# Patient Record
Sex: FEMALE | Race: WHITE | Employment: PART TIME | URBAN - METROPOLITAN AREA
[De-identification: names, ages, dates, MRNs, and addresses within clinical notes are randomized per-mention and may not be internally consistent; named-entity substitution may affect disease eponyms.]

---

## 2017-02-15 PROBLEM — E11.42 TYPE 2 DIABETES MELLITUS WITH DIABETIC POLYNEUROPATHY, WITHOUT LONG-TERM CURRENT USE OF INSULIN (HCC): Status: ACTIVE | Noted: 2017-02-15

## 2017-02-27 PROBLEM — K43.0 INCISIONAL HERNIA WITH OBSTRUCTION BUT NO GANGRENE: Status: ACTIVE | Noted: 2017-02-27

## 2017-02-27 PROBLEM — M62.08 DIASTASIS OF RECTUS ABDOMINIS: Status: ACTIVE | Noted: 2017-02-27

## 2017-02-27 PROBLEM — E66.9 OBESITY, CLASS I, BMI 30.0-34.9 (SEE ACTUAL BMI): Status: ACTIVE | Noted: 2017-02-27

## 2017-03-14 ENCOUNTER — HOSPITAL ENCOUNTER (OUTPATIENT)
Dept: CT IMAGING | Age: 67
Discharge: HOME | End: 2017-03-14
Attending: SURGERY
Payer: MEDICARE

## 2017-03-14 VITALS — WEIGHT: 176 LBS | BODY MASS INDEX: 30.05 KG/M2 | HEIGHT: 64 IN

## 2017-03-14 DIAGNOSIS — M62.08 DIASTASIS OF RECTUS ABDOMINIS: ICD-10-CM

## 2017-03-14 DIAGNOSIS — E66.9 OBESITY, CLASS I, BMI 30.0-34.9 (SEE ACTUAL BMI): ICD-10-CM

## 2017-03-14 DIAGNOSIS — K43.0 INCISIONAL HERNIA WITH OBSTRUCTION BUT NO GANGRENE: ICD-10-CM

## 2017-03-14 PROCEDURE — 74011636320 HC RX REV CODE- 636/320: Performed by: SURGERY

## 2017-03-14 PROCEDURE — 74177 CT ABD & PELVIS W/CONTRAST: CPT

## 2017-03-14 PROCEDURE — 74011000258 HC RX REV CODE- 258: Performed by: SURGERY

## 2017-03-14 RX ORDER — SODIUM CHLORIDE 0.9 % (FLUSH) 0.9 %
10 SYRINGE (ML) INJECTION
Status: COMPLETED | OUTPATIENT
Start: 2017-03-14 | End: 2017-03-14

## 2017-03-14 RX ADMIN — DIATRIZOATE MEGLUMINE AND DIATRIZOATE SODIUM 15 ML: 660; 100 LIQUID ORAL; RECTAL at 14:38

## 2017-03-14 RX ADMIN — IOVERSOL 100 ML: 741 INJECTION INTRA-ARTERIAL; INTRAVENOUS at 14:38

## 2017-03-14 RX ADMIN — Medication 10 ML: at 14:38

## 2017-03-14 RX ADMIN — SODIUM CHLORIDE 100 ML: 900 INJECTION, SOLUTION INTRAVENOUS at 14:38

## 2017-04-19 ENCOUNTER — ANESTHESIA EVENT (OUTPATIENT)
Dept: ENDOSCOPY | Age: 67
End: 2017-04-19
Payer: MEDICARE

## 2017-04-19 RX ORDER — HYDROMORPHONE HYDROCHLORIDE 2 MG/ML
0.5 INJECTION, SOLUTION INTRAMUSCULAR; INTRAVENOUS; SUBCUTANEOUS
Status: CANCELLED | OUTPATIENT
Start: 2017-04-19

## 2017-04-19 RX ORDER — OXYCODONE HYDROCHLORIDE 5 MG/1
5 TABLET ORAL
Status: CANCELLED | OUTPATIENT
Start: 2017-04-19

## 2017-04-19 RX ORDER — SODIUM CHLORIDE, SODIUM LACTATE, POTASSIUM CHLORIDE, CALCIUM CHLORIDE 600; 310; 30; 20 MG/100ML; MG/100ML; MG/100ML; MG/100ML
75 INJECTION, SOLUTION INTRAVENOUS CONTINUOUS
Status: CANCELLED | OUTPATIENT
Start: 2017-04-19

## 2017-04-19 RX ORDER — NALOXONE HYDROCHLORIDE 0.4 MG/ML
0.1 INJECTION, SOLUTION INTRAMUSCULAR; INTRAVENOUS; SUBCUTANEOUS AS NEEDED
Status: CANCELLED | OUTPATIENT
Start: 2017-04-19 | End: 2017-04-19

## 2017-04-19 RX ORDER — SODIUM CHLORIDE 0.9 % (FLUSH) 0.9 %
5-10 SYRINGE (ML) INJECTION AS NEEDED
Status: CANCELLED | OUTPATIENT
Start: 2017-04-19

## 2017-04-19 RX ORDER — ACETAMINOPHEN 500 MG
500 TABLET ORAL ONCE
Status: CANCELLED | OUTPATIENT
Start: 2017-04-19 | End: 2017-04-19

## 2017-04-19 RX ORDER — DIPHENHYDRAMINE HYDROCHLORIDE 50 MG/ML
12.5 INJECTION, SOLUTION INTRAMUSCULAR; INTRAVENOUS ONCE
Status: CANCELLED | OUTPATIENT
Start: 2017-04-19 | End: 2017-04-19

## 2017-04-19 RX ORDER — OXYCODONE HYDROCHLORIDE 5 MG/1
10 TABLET ORAL
Status: CANCELLED | OUTPATIENT
Start: 2017-04-19

## 2017-04-19 RX ORDER — ONDANSETRON 2 MG/ML
4 INJECTION INTRAMUSCULAR; INTRAVENOUS ONCE
Status: CANCELLED | OUTPATIENT
Start: 2017-04-19 | End: 2017-04-19

## 2017-04-20 ENCOUNTER — ANESTHESIA (OUTPATIENT)
Dept: ENDOSCOPY | Age: 67
End: 2017-04-20
Payer: MEDICARE

## 2017-04-20 ENCOUNTER — HOSPITAL ENCOUNTER (OUTPATIENT)
Age: 67
Setting detail: OUTPATIENT SURGERY
Discharge: HOME | End: 2017-04-20
Attending: SURGERY | Admitting: SURGERY
Payer: MEDICARE

## 2017-04-20 VITALS
BODY MASS INDEX: 31.01 KG/M2 | TEMPERATURE: 97 F | OXYGEN SATURATION: 98 % | HEART RATE: 72 BPM | SYSTOLIC BLOOD PRESSURE: 144 MMHG | RESPIRATION RATE: 16 BRPM | DIASTOLIC BLOOD PRESSURE: 71 MMHG | WEIGHT: 175 LBS | HEIGHT: 63 IN

## 2017-04-20 LAB — GLUCOSE BLD STRIP.AUTO-MCNC: 113 MG/DL (ref 65–100)

## 2017-04-20 PROCEDURE — 74011000250 HC RX REV CODE- 250: Performed by: ANESTHESIOLOGY

## 2017-04-20 PROCEDURE — 76060000032 HC ANESTHESIA 0.5 TO 1 HR: Performed by: SURGERY

## 2017-04-20 PROCEDURE — 77030029929: Performed by: SURGERY

## 2017-04-20 PROCEDURE — 77030013996 HC SNR POLYP ENDOSC OCOA -B: Performed by: SURGERY

## 2017-04-20 PROCEDURE — 76040000026: Performed by: SURGERY

## 2017-04-20 PROCEDURE — 74011250636 HC RX REV CODE- 250/636

## 2017-04-20 PROCEDURE — 88305 TISSUE EXAM BY PATHOLOGIST: CPT | Performed by: SURGERY

## 2017-04-20 PROCEDURE — 74011250636 HC RX REV CODE- 250/636: Performed by: ANESTHESIOLOGY

## 2017-04-20 PROCEDURE — 77030011640 HC PAD GRND REM COVD -A: Performed by: SURGERY

## 2017-04-20 PROCEDURE — 77030013991 HC SNR POLYP ENDOSC BSC -A: Performed by: SURGERY

## 2017-04-20 PROCEDURE — 74011000250 HC RX REV CODE- 250

## 2017-04-20 PROCEDURE — 82962 GLUCOSE BLOOD TEST: CPT

## 2017-04-20 RX ORDER — SODIUM CHLORIDE 0.9 % (FLUSH) 0.9 %
5-10 SYRINGE (ML) INJECTION EVERY 8 HOURS
Status: DISCONTINUED | OUTPATIENT
Start: 2017-04-20 | End: 2017-04-20 | Stop reason: HOSPADM

## 2017-04-20 RX ORDER — PROPOFOL 10 MG/ML
INJECTION, EMULSION INTRAVENOUS AS NEEDED
Status: DISCONTINUED | OUTPATIENT
Start: 2017-04-20 | End: 2017-04-20 | Stop reason: HOSPADM

## 2017-04-20 RX ORDER — FAMOTIDINE 20 MG/1
20 TABLET, FILM COATED ORAL ONCE
Status: DISCONTINUED | OUTPATIENT
Start: 2017-04-20 | End: 2017-04-20 | Stop reason: HOSPADM

## 2017-04-20 RX ORDER — LIDOCAINE HYDROCHLORIDE 10 MG/ML
0.1 INJECTION INFILTRATION; PERINEURAL AS NEEDED
Status: DISCONTINUED | OUTPATIENT
Start: 2017-04-20 | End: 2017-04-20 | Stop reason: HOSPADM

## 2017-04-20 RX ORDER — ESMOLOL HYDROCHLORIDE 10 MG/ML
INJECTION INTRAVENOUS AS NEEDED
Status: DISCONTINUED | OUTPATIENT
Start: 2017-04-20 | End: 2017-04-20 | Stop reason: HOSPADM

## 2017-04-20 RX ORDER — SODIUM CHLORIDE 0.9 % (FLUSH) 0.9 %
5-10 SYRINGE (ML) INJECTION AS NEEDED
Status: DISCONTINUED | OUTPATIENT
Start: 2017-04-20 | End: 2017-04-20 | Stop reason: HOSPADM

## 2017-04-20 RX ORDER — LIDOCAINE HYDROCHLORIDE 20 MG/ML
INJECTION, SOLUTION EPIDURAL; INFILTRATION; INTRACAUDAL; PERINEURAL AS NEEDED
Status: DISCONTINUED | OUTPATIENT
Start: 2017-04-20 | End: 2017-04-20 | Stop reason: HOSPADM

## 2017-04-20 RX ORDER — SODIUM CHLORIDE, SODIUM LACTATE, POTASSIUM CHLORIDE, CALCIUM CHLORIDE 600; 310; 30; 20 MG/100ML; MG/100ML; MG/100ML; MG/100ML
1000 INJECTION, SOLUTION INTRAVENOUS CONTINUOUS
Status: DISCONTINUED | OUTPATIENT
Start: 2017-04-20 | End: 2017-04-20 | Stop reason: HOSPADM

## 2017-04-20 RX ORDER — FENTANYL CITRATE 50 UG/ML
100 INJECTION, SOLUTION INTRAMUSCULAR; INTRAVENOUS AS NEEDED
Status: DISCONTINUED | OUTPATIENT
Start: 2017-04-20 | End: 2017-04-20 | Stop reason: HOSPADM

## 2017-04-20 RX ORDER — FAMOTIDINE 10 MG/ML
20 INJECTION INTRAVENOUS ONCE
Status: COMPLETED | OUTPATIENT
Start: 2017-04-20 | End: 2017-04-20

## 2017-04-20 RX ORDER — PROPOFOL 10 MG/ML
INJECTION, EMULSION INTRAVENOUS
Status: DISCONTINUED | OUTPATIENT
Start: 2017-04-20 | End: 2017-04-20 | Stop reason: HOSPADM

## 2017-04-20 RX ORDER — MIDAZOLAM HYDROCHLORIDE 1 MG/ML
2 INJECTION, SOLUTION INTRAMUSCULAR; INTRAVENOUS
Status: DISCONTINUED | OUTPATIENT
Start: 2017-04-20 | End: 2017-04-20 | Stop reason: HOSPADM

## 2017-04-20 RX ADMIN — PROPOFOL 50 MG: 10 INJECTION, EMULSION INTRAVENOUS at 08:11

## 2017-04-20 RX ADMIN — ESMOLOL HYDROCHLORIDE 30 MG: 10 INJECTION INTRAVENOUS at 08:26

## 2017-04-20 RX ADMIN — PROPOFOL 160 MCG/KG/MIN: 10 INJECTION, EMULSION INTRAVENOUS at 08:11

## 2017-04-20 RX ADMIN — PROPOFOL 30 MG: 10 INJECTION, EMULSION INTRAVENOUS at 08:16

## 2017-04-20 RX ADMIN — LIDOCAINE HYDROCHLORIDE 60 MG: 20 INJECTION, SOLUTION EPIDURAL; INFILTRATION; INTRACAUDAL; PERINEURAL at 08:09

## 2017-04-20 RX ADMIN — FAMOTIDINE 20 MG: 10 INJECTION, SOLUTION INTRAVENOUS at 07:37

## 2017-04-20 RX ADMIN — SODIUM CHLORIDE, SODIUM LACTATE, POTASSIUM CHLORIDE, AND CALCIUM CHLORIDE 1000 ML: 600; 310; 30; 20 INJECTION, SOLUTION INTRAVENOUS at 07:37

## 2017-04-20 NOTE — IP AVS SNAPSHOT
Cipriano Vance 
 
 
 [address] [facility] [address] [facility] 
[phone] Patient: Raulito Jones MRN: [id number] DMR:6/9/[birth year]44 You are allergic to the following No active allergies Recent Documentation Height Weight BMI OB Status Smoking Status 1.6 m 79.4 kg 31 kg/m2 Postmenopausal Never Smoker Emergency Contacts Name Discharge Info Relation Home Work Mobile [facility] DISCHARGE CAREGIVER [3] Son [22] [phone] About your hospitalization You were admitted on:  April 20, 2017 You last received care in the:  [facility] You were discharged on:  April 20, 2017 Unit phone number:  [phone] Why you were hospitalized Your primary diagnosis was:  Not on File Providers Seen During Your Hospitalizations Provider Role Specialty Primary office phone Ning Helton MD Attending Provider General Surgery [phone] Your Primary Care Physician (PCP) Primary Care Physician Office Phone Office Fax Peg Yin [phone] Follow-up Information None Current Discharge Medication List  
  
ASK your doctor about these medications Dose & Instructions Dispensing Information Comments Morning Noon Evening Bedtime  
 gabapentin 300 mg capsule Commonly known as:  NEURONTIN Your last dose was: Your next dose is:    
   
   
 Dose:  300 mg Take 1 Cap by mouth nightly as needed. Quantity:  90 Cap Refills:  3  
     
   
   
   
  
 glipiZIDE 10 mg tablet Commonly known as:  Shawn Domínguezel Your last dose was: Your next dose is:    
   
   
 Dose:  10 mg Take 1 Tab by mouth daily. Quantity:  90 Tab Refills:  3  
     
   
   
   
  
 lisinopril 10 mg tablet Commonly known as:  Yi Gravel Your last dose was:     
   
Your next dose is:    
   
   
 Dose:  10 mg  
 Take 1 Tab by mouth daily. Quantity:  90 Tab Refills:  3  
     
   
   
   
  
 metFORMIN 500 mg tablet Commonly known as:  GLUCOPHAGE Your last dose was: Your next dose is: TAKE TWO TABLETS BY MOUTH TWICE DAILY Quantity:  360 Tab Refills:  3  
     
   
   
   
  
 TYLENOL 325 mg tablet Generic drug:  acetaminophen Your last dose was: Your next dose is:    
   
   
 Dose:  650 mg Take 650 mg by mouth every four (4) hours as needed for Pain. Refills:  0 Discharge Instructions Gastrointestinal Colonoscopy/Flexible Sigmoidoscopy - Lower Exam Discharge Instructions 1. Call Dr. Sidney Carter for any problems or questions. 2. Contact the doctors office for follow up appointment as directed 3. Medication may cause drowsiness for several hours, therefore, do not drive or operate machinery for remainder of the day. 4. No alcohol today. 5. Ordinarily, you may resume regular diet and activity after exam unless otherwise specified by your physician. 6. Because of air put into your colon during exam, you may experience some abdominal distension, relieved by the passage of gas, for several hours. 7. Contact your physician if you have any of the following: 
a. Excessive amount of bleeding  large amount when having a bowel movement. Occasional specks of blood with bowel movement would not be unusual. 
b. Severe abdominal pain 
c. Fever or Chills 8. Polyp Removal  follow these additional instructions 
a. Clear liquid diet for the next meal (jell-o, broth, clear drinks) b. Soft diet for 24 hours, then resume regular diet  
c. Take Metamucil  1 tablespoon in juice every morning for 3 days 
d. No Aspirin, Advil, Aleve, Nuprin, Ibuprofen, or medications that contain these drugs for 2 weeks. Any additional instructions: Follow up in office in 1-2 weeks with Dr. Sidney Carter for pathology results. Instructions given to Merari Burger and other family members. Instructions given by:  Dr. Graciela Hinson Discharge Orders None ACO Transitions of Care Introducing [employer] [address] offers a voluntary care coordination program to provide high quality service and care to [facility] fee-for-service beneficiaries. [employer] was designed to help you enhance your health and well-being through the following services: ? Transitions of Care  support for individuals who are transitioning from one care setting to another (example: Hospital to home). ? Chronic and Complex Care Coordination  support for individuals and caregivers of those with serious or chronic illnesses or with more than one chronic (ongoing) condition and those who take a number of different medications. If you meet specific medical criteria, a [address] [place] [address] Rd may call you directly to coordinate your care with your primary care physician and your other care providers. For questions about the [facility] programs, please, contact your physicians office. For general questions or additional information about Accountable Care Organizations: 
Please visit www.medicare.gov/acos. html or call 1-800-MEDICARE (1-791.596.9994) TTY users should call 3-752.139.3531. Introducing [facility]! Carina Duran introduces [employer] patient portal. Now you can access parts of your medical record, email your doctor's office, and request medication refills online. 1. In your internet browser, go to [email or web address] 2. Click on the First Time User? Click Here link in the Sign In box. You will see the New Member Sign Up page. 3. Enter your [email or web address] Access Code exactly as it appears below. You will not need to use this code after youve completed the sign-up process.  If you do not sign up before the expiration date, you must request a new code. · [employer] Access Code: [email or web address] Expires: 5/14/2017 12:14 PM 
 
4. Enter the last four digits of your Social Security Number (xxxx) and Date of Birth (mm/dd/yyyy) as indicated and click Submit. You will be taken to the next sign-up page. 5. Create a [employer] ID. This will be your [employer] login ID and cannot be changed, so think of one that is secure and easy to remember. 6. Create a [employer] password. You can change your password at any time. 7. Enter your Password Reset Question and Answer. This can be used at a later time if you forget your password. 8. Enter your e-mail address. You will receive e-mail notification when new information is available in [address] 9. Click Sign Up. You can now view and download portions of your medical record. 10. Click the Download Summary menu link to download a portable copy of your medical information. If you have questions, please visit the Frequently Asked Questions section of the [employer] website. Remember, [employer] is NOT to be used for urgent needs. For medical emergencies, dial 911. Now available from your iPhone and Android! General Information Please provide this summary of care documentation to your next provider. Patient Signature:  ____________________________________________________________ Date:  ____________________________________________________________  
  
Karthik Roblero Provider Signature:  ____________________________________________________________ Date:  ____________________________________________________________

## 2017-04-20 NOTE — ANESTHESIA POSTPROCEDURE EVALUATION
Post-Anesthesia Evaluation and Assessment    Patient: Lianna Jimenez MRN: [id number]  SSN: [id number]    YOB: 1950  Age: 78 y.o. Sex: female       Cardiovascular Function/Vital Signs  Visit Vitals    /63    Pulse 81    Temp 36.1 °C (97 °F)    Resp 12    Ht 5' 3\" (1.6 m)    Wt 79.4 kg (175 lb)    SpO2 93%    BMI 31 kg/m2       Patient is status post total IV anesthesia anesthesia for Procedure(s):  COLONOSCOPY  BMI 29  ENDOSCOPIC POLYPECTOMY  INJECTION. Nausea/Vomiting: None    Postoperative hydration reviewed and adequate. Pain:  Pain Scale 1: Numeric (0 - 10) (04/20/17 0721)  Pain Intensity 1: 0 (04/20/17 0721)   Managed    Neurological Status: At baseline    Mental Status and Level of Consciousness: Arousable    Pulmonary Status:   O2 Device: Nasal cannula (04/20/17 0845)   Adequate oxygenation and airway patent    Complications related to anesthesia: None    Post-anesthesia assessment completed.  No concerns    Signed By: Connor Page MD     April 20, 2017

## 2017-04-20 NOTE — H&P
[facility]  [address] [place] [address]  [phone]    H&P Note   Willie Treadwell   MRN: [id number]     : 1950        HPI: Willie Treadwell is a 78 y.o. female who returns after CT scan evaluation of her abdominal wall which confirms her physical exam.  She has a 7.97 cm periumbilical defect that contains incarcerated fat and no bowel. She has a wide rectus diastasis and severe denervation of the R side of her abdominal wall with very little abdominal wall muscle until far lateral.  Select images are below. She also presents to discuss colonoscopy. She is past due and should be cleared prior to any hernia repair. She has normal daily BMs and denies seeing and blood or mucous per rectum. She has no FH for colon cancer or polyps. She was referred by Dr. Ramon Cunningham for ventral hernia. The patient is a very pleasant woman who had gallbladder surgery ~5 years ago. It was not performed at [facility]. She had attempt at laparoscopic but was converted to an open large R subcostal incision. She noticed a bulge ~4 years ago. She denies obstructive symptoms but she has been experiencing pain with lifting. 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 She would like a repair but would like to delay until the Spring flower season has calmed down. She is also referred for colonoscopy. Past Medical History:   Diagnosis Date    Diabetes ([address]     Hypertension     Type 2 diabetes mellitus with diabetic polyneuropathy, without long-term current use of insulin ([address] 2/15/2017    Type II or unspecified type diabetes mellitus without mention of complication, not stated as uncontrolled 2015    avg-150-no hypo symptoms     Past Surgical History:   Procedure Laterality Date    HX CHOLECYSTECTOMY       No current facility-administered medications for this encounter.       Current Outpatient Prescriptions   Medication Sig    acetaminophen (TYLENOL) 325 mg tablet Take 650 mg by mouth every four (4) hours as needed for Pain.  metFORMIN (GLUCOPHAGE) 500 mg tablet TAKE TWO TABLETS BY MOUTH TWICE DAILY (Patient taking differently: Take 1,000 mg by mouth two (2) times daily (with meals). TAKE TWO TABLETS BY MOUTH TWICE DAILY)    gabapentin (NEURONTIN) 300 mg capsule Take 1 Cap by mouth nightly as needed.  lisinopril (PRINIVIL, ZESTRIL) 10 mg tablet Take 1 Tab by mouth daily. (Patient taking differently: Take 10 mg by mouth every morning.)    glipiZIDE (GLUCOTROL) 10 mg tablet Take 1 Tab by mouth daily. (Patient taking differently: Take 10 mg by mouth every morning.)     ALLERGIES:  Review of patient's allergies indicates no known allergies. Social History     Social History    Marital status:      Spouse name: N/A    Number of children: N/A    Years of education: N/A     Social History Main Topics    Smoking status: Never Smoker    Smokeless tobacco: Never Used    Alcohol use No    Drug use: No    Sexual activity: Not Currently     Other Topics Concern    Not on file     Social History Narrative     History   Smoking Status    Never Smoker   Smokeless Tobacco    Never Used     Family History   Problem Relation Age of Onset    Diabetes Mother     Cancer Father     Cancer Sister     Cancer Sister     Cancer Brother        ROS: The patient has no difficulty with chest pain or shortness of breath. No fever or chills. The patient denies any personal or family history of abnormal clotting or bleeding. Comprehensive review of systems was otherwise unremarkable except as noted above. Physical Exam:   Constitutional: Alert oriented cooperative patient in no acute distress. Visit Vitals    /70    Pulse 75    Ht 5' 4\" (1.626 m)    Wt 173 lb 8 oz (78.7 kg)    SpO2 98%    BMI 29.78 kg/m2   Eyes:Sclera are clear without icterus. ENMT: no obvious neck masses, no ear or lip lesions  CV: RRR.  Normal perfusion  Resp: No JVD. Breathing is  non-labored. GI: obese, soft and non-distended, obvious bulge to L of umbilicus. Wide rectus diastasis, 2-3 cm defect associated with periumbilical incision scar with nonreducible bulging to left that does not feel to contain obvious bowel. Large and wide R subcostal incision with classic denervation pattern with atrophy and non contraction of R rectus abdominus inferior to the scar. The right side bulges with abdominal wall contraction. Musculoskeletal: unremarkable with normal function. Neuro:  No obvious focal deficits  Psychiatric: normal affect and mood, no memory impairment    No results found for: WBC, WBCLT, HGB, HGBP, HCT, PHCT, RBCH, PLT, MCV, HGBEXT, HCTEXT, PLTEXT, HGBEXT, HCTEXT, PLTEXT    Lab Results   Component Value Date/Time    Sodium 142 02/15/2017 10:14 AM    Potassium 4.6 02/15/2017 10:14 AM    Chloride 100 02/15/2017 10:14 AM    CO2 24 02/15/2017 10:14 AM    BUN 13 02/15/2017 10:14 AM    Creatinine 0.67 02/15/2017 10:14 AM    Glucose 176 02/15/2017 10:14 AM    Bilirubin, total 0.4 02/15/2017 10:14 AM    AST (SGOT) 17 02/15/2017 10:14 AM    Alk. phosphatase 91 02/15/2017 10:14 AM     I reviewed her CT images    CT ABDOMEN AND PELVIS WITH CONTRAST: 3/14/2017  HISTORY: evaluate incisional abdominal wall hernias and rectus diastasis;  evaluate incisional abdominal wall hernias and rectus diastasis, eval incisional  abd wall hernia lt abd. Pain worsening over past month. gb removed, no hx cancer  COMPARISON: None available  TECHNIQUE: Oral contrast was administered. 125 cc of nonionic intravenous  contrast was injected, and axial helical CT images were obtained from above the  diaphragm through the pelvis. Coronal reformatted images were obtained at the  scanner console and made available for review.  Radiation dose reduction  techniques were used for this study: Our CT scanners use one or all of the  following: Automated exposure control, adjustment of the mA and/or kVp according  to patient's size, iterative reconstruction. FINDINGS:  ABDOMEN:  Minimal dependent subsegmental atelectasis bilateral lung bases. Evidence of  cholecystectomy. There is a small fluid collection in the gallbladder fossa  measuring approximately 2.1 x 3.0 cm, most likely representing a small  postoperative seroma. Normal-appearing liver, spleen, pancreas, bilateral  adrenal glands and kidneys. Mild calcific atherosclerosis of a normal caliber  abdominal aorta. No evidence of significant lymphadenopathy. Small duodenal  diverticulum. Normal-appearing small bowel. No evidence of intraperitoneal  free air or free fluid. There is a moderate-sized fat-containing periumbilical  hernia, with a narrow neck measuring approximately 2.8 cm in diameter. PELVIS:  Normal-appearing urinary bladder. Normal-appearing uterus and bilateral adnexa. Normal-appearing colon and appendix. No evidence of pelvic free fluid. No  evidence of significant inguinal or pelvic sidewall lymphadenopathy. Visualized osseous structures unremarkable. IMPRESSION:   1. There is a moderate-sized fat-containing periumbilical hernia. 2. Other incidental findings as above.              Assessment/Plan:     Ruddy Baltazar is a 78 y.o. female who has signs and symptoms consistent with a periumbilical ventral incisional hernia from a trocar site that is complicated by R sided abdominal wall denervation and wide diastasis of the rectus muscles. She is also obese and has diabetes. The fascial defect is of moderate size but carries risk of bowel incarceration and obstruction. Unfortunately she is high risk for failure of the repair as well. She will require a mesh repair which carries a greater complication rate but has the greatest percentage to be the only durable repair. She will require an oversized mesh due to her poor surrounding tissue.   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 CT scan confirms her physical findings and is not at immediate risk for bowel incarceration. She needs her colon cleared prior to any hernia repair. We discussed next proceeding with colonoscopy. I discussed the patient's condition and treatment options with the patient. I discussed risks of colonoscopy in language the patient could understand including bleeding, infection, aspiration, perforation, medication reaction, need for further endoscopy or surgery, abscess, fistula, SBO, DVT, PE, heart attack, stroke, renal failure, respiratory failure, ventilatory dependence, and death. The patient voiced understanding of all this and all questions were answered. Alternatives to colonoscopy were discussed also and risks of the alternatives. The patient requested that we proceed with colonoscopy. Informed consent was obtained.      Problem List  Date Reviewed: 4/19/2017          Codes Class Noted    Incisional hernia with obstruction but no gangrene ICD-10-CM: K43.0  ICD-9-CM: 552.21  2/27/2017        Diastasis of rectus abdominis ICD-10-CM: M62.08  ICD-9-CM: 728.84  2/27/2017        Obesity, Class I, BMI 30.0-34.9 (see actual BMI) ICD-10-CM: E66.9  ICD-9-CM: 278.00  2/27/2017        Type 2 diabetes mellitus with diabetic polyneuropathy, without long-term current use of insulin ([facility] ICD-10-CM: E11.42  ICD-9-CM: 250.60, 357.2  2/15/2017        Type II diabetes mellitus ([facility] ICD-10-CM: E11.9  ICD-9-CM: 250.00  8/5/2015        Essential hypertension, benign ICD-10-CM: I10  ICD-9-CM: 401.1  8/5/2015                Nena Dao MD,  FACS

## 2017-04-20 NOTE — DISCHARGE INSTRUCTIONS
Gastrointestinal Colonoscopy/Flexible Sigmoidoscopy - Lower Exam Discharge Instructions  1. Call Dr. Tomás Cespedes for any problems or questions. 2. Contact the doctors office for follow up appointment as directed  3. Medication may cause drowsiness for several hours, therefore, do not drive or operate machinery for remainder of the day. 4. No alcohol today. 5. Ordinarily, you may resume regular diet and activity after exam unless otherwise specified by your physician. 6. Because of air put into your colon during exam, you may experience some abdominal distension, relieved by the passage of gas, for several hours. 7. Contact your physician if you have any of the following:  a. Excessive amount of bleeding - large amount when having a bowel movement. Occasional specks of blood with bowel movement would not be unusual.  b. Severe abdominal pain  c. Fever or Chills  8. Polyp Removal - follow these additional instructions  a. Clear liquid diet for the next meal (jell-o, broth, clear drinks)  b. Soft diet for 24 hours, then resume regular diet   c. Take Metamucil - 1 tablespoon in juice every morning for 3 days  d. No Aspirin, Advil, Aleve, Nuprin, Ibuprofen, or medications that contain these drugs for 2 weeks. Any additional instructions: Follow up in office in 1-2 weeks with Dr. Tomás Cespedes for pathology results. Instructions given to Cristian Guadalupe and other family members.   Instructions given by:  Dr. Tomás Cespedes

## 2017-04-20 NOTE — INTERVAL H&P NOTE
H&P Update:  Abner Gordon was seen and examined. History and physical has been reviewed. The patient has been examined.  There have been no significant clinical changes since the completion of the originally dated History and Physical.    Signed By: Jaden Baum MD     April 20, 2017 8:11 AM

## 2017-04-20 NOTE — ROUTINE PROCESS
Discharge instructions given to son. IV discontinued and skin c/d/i. Pt passed flatus and tolerating liquids well. Pt ready for discharge.

## 2017-04-20 NOTE — ANESTHESIA PREPROCEDURE EVALUATION
Anesthetic History   No history of anesthetic complications            Review of Systems / Medical History  Patient summary reviewed and pertinent labs reviewed    Pulmonary  Within defined limits                 Neuro/Psych   Within defined limits           Cardiovascular    Hypertension: well controlled              Exercise tolerance: >4 METS     GI/Hepatic/Renal  Within defined limits              Endo/Other    Diabetes    Obesity     Other Findings              Physical Exam    Airway  Mallampati: II  TM Distance: 4 - 6 cm  Neck ROM: normal range of motion   Mouth opening: Normal     Cardiovascular    Rhythm: regular  Rate: normal         Dental  No notable dental hx       Pulmonary  Breath sounds clear to auscultation               Abdominal  GI exam deferred       Other Findings            Anesthetic Plan    ASA: 2  Anesthesia type: total IV anesthesia          Induction: Intravenous  Anesthetic plan and risks discussed with: Patient

## 2017-04-28 PROBLEM — D12.6 TUBULOVILLOUS ADENOMA OF COLON: Status: ACTIVE | Noted: 2017-04-28

## 2017-06-21 ENCOUNTER — ANESTHESIA EVENT (OUTPATIENT)
Dept: ENDOSCOPY | Age: 67
End: 2017-06-21
Payer: MEDICARE

## 2017-06-21 RX ORDER — SODIUM CHLORIDE, SODIUM LACTATE, POTASSIUM CHLORIDE, CALCIUM CHLORIDE 600; 310; 30; 20 MG/100ML; MG/100ML; MG/100ML; MG/100ML
100 INJECTION, SOLUTION INTRAVENOUS CONTINUOUS
Status: CANCELLED | OUTPATIENT
Start: 2017-06-21

## 2017-06-21 RX ORDER — SODIUM CHLORIDE 0.9 % (FLUSH) 0.9 %
5-10 SYRINGE (ML) INJECTION AS NEEDED
Status: CANCELLED | OUTPATIENT
Start: 2017-06-21

## 2017-06-22 ENCOUNTER — ANESTHESIA (OUTPATIENT)
Dept: ENDOSCOPY | Age: 67
End: 2017-06-22
Payer: MEDICARE

## 2017-06-22 ENCOUNTER — HOSPITAL ENCOUNTER (OUTPATIENT)
Age: 67
Setting detail: OUTPATIENT SURGERY
Discharge: HOME | End: 2017-06-22
Attending: SURGERY | Admitting: SURGERY
Payer: MEDICARE

## 2017-06-22 VITALS
DIASTOLIC BLOOD PRESSURE: 74 MMHG | RESPIRATION RATE: 16 BRPM | TEMPERATURE: 96.8 F | OXYGEN SATURATION: 96 % | HEART RATE: 82 BPM | SYSTOLIC BLOOD PRESSURE: 140 MMHG

## 2017-06-22 LAB
GLUCOSE BLD STRIP.AUTO-MCNC: 120 MG/DL (ref 65–100)
GLUCOSE BLD STRIP.AUTO-MCNC: 148 MG/DL (ref 65–100)
GLUCOSE BLD STRIP.AUTO-MCNC: 210 MG/DL (ref 65–100)

## 2017-06-22 PROCEDURE — 77030011640 HC PAD GRND REM COVD -A: Performed by: SURGERY

## 2017-06-22 PROCEDURE — 74011000250 HC RX REV CODE- 250

## 2017-06-22 PROCEDURE — 88305 TISSUE EXAM BY PATHOLOGIST: CPT | Performed by: SURGERY

## 2017-06-22 PROCEDURE — 82962 GLUCOSE BLOOD TEST: CPT

## 2017-06-22 PROCEDURE — 76060000032 HC ANESTHESIA 0.5 TO 1 HR: Performed by: SURGERY

## 2017-06-22 PROCEDURE — 76040000025: Performed by: SURGERY

## 2017-06-22 PROCEDURE — 74011250636 HC RX REV CODE- 250/636

## 2017-06-22 PROCEDURE — 74011636637 HC RX REV CODE- 636/637: Performed by: ANESTHESIOLOGY

## 2017-06-22 PROCEDURE — 77030013991 HC SNR POLYP ENDOSC BSC -A: Performed by: SURGERY

## 2017-06-22 RX ORDER — PROPOFOL 10 MG/ML
INJECTION, EMULSION INTRAVENOUS
Status: DISCONTINUED | OUTPATIENT
Start: 2017-06-22 | End: 2017-06-22 | Stop reason: HOSPADM

## 2017-06-22 RX ORDER — LIDOCAINE HYDROCHLORIDE 20 MG/ML
INJECTION, SOLUTION EPIDURAL; INFILTRATION; INTRACAUDAL; PERINEURAL AS NEEDED
Status: DISCONTINUED | OUTPATIENT
Start: 2017-06-22 | End: 2017-06-22 | Stop reason: HOSPADM

## 2017-06-22 RX ORDER — SODIUM CHLORIDE, SODIUM LACTATE, POTASSIUM CHLORIDE, CALCIUM CHLORIDE 600; 310; 30; 20 MG/100ML; MG/100ML; MG/100ML; MG/100ML
INJECTION, SOLUTION INTRAVENOUS
Status: DISCONTINUED | OUTPATIENT
Start: 2017-06-22 | End: 2017-06-22 | Stop reason: HOSPADM

## 2017-06-22 RX ORDER — PROPOFOL 10 MG/ML
INJECTION, EMULSION INTRAVENOUS AS NEEDED
Status: DISCONTINUED | OUTPATIENT
Start: 2017-06-22 | End: 2017-06-22 | Stop reason: HOSPADM

## 2017-06-22 RX ADMIN — PROPOFOL 50 MG: 10 INJECTION, EMULSION INTRAVENOUS at 08:07

## 2017-06-22 RX ADMIN — PROPOFOL 40 MG: 10 INJECTION, EMULSION INTRAVENOUS at 08:26

## 2017-06-22 RX ADMIN — PROPOFOL 30 MG: 10 INJECTION, EMULSION INTRAVENOUS at 08:16

## 2017-06-22 RX ADMIN — PROPOFOL 30 MG: 10 INJECTION, EMULSION INTRAVENOUS at 08:19

## 2017-06-22 RX ADMIN — LIDOCAINE HYDROCHLORIDE 40 MG: 20 INJECTION, SOLUTION EPIDURAL; INFILTRATION; INTRACAUDAL; PERINEURAL at 08:07

## 2017-06-22 RX ADMIN — PROPOFOL 160 MCG/KG/MIN: 10 INJECTION, EMULSION INTRAVENOUS at 08:07

## 2017-06-22 RX ADMIN — INSULIN HUMAN 6 UNITS: 100 INJECTION, SOLUTION PARENTERAL at 07:36

## 2017-06-22 RX ADMIN — SODIUM CHLORIDE, SODIUM LACTATE, POTASSIUM CHLORIDE, CALCIUM CHLORIDE: 600; 310; 30; 20 INJECTION, SOLUTION INTRAVENOUS at 08:03

## 2017-06-22 NOTE — ROUTINE PROCESS
Discharge instructions given to son. IV discontinued with skin c/d/i. Pt has been passing flatus and tolerating liquids well. Pt ready for discharge.

## 2017-06-22 NOTE — DISCHARGE INSTRUCTIONS
Gastrointestinal Colonoscopy/Flexible Sigmoidoscopy - Lower Exam Discharge Instructions  1. Call Dr. Boris Larsen for any problems or questions. 2. Contact the doctors office for follow up appointment as directed  3. Medication may cause drowsiness for several hours, therefore, do not drive or operate machinery for remainder of the day. 4. No alcohol today. 5. Ordinarily, you may resume regular diet and activity after exam unless otherwise specified by your physician. 6. Because of air put into your colon during exam, you may experience some abdominal distension, relieved by the passage of gas, for several hours. 7. Contact your physician if you have any of the following:  a. Excessive amount of bleeding - large amount when having a bowel movement. Occasional specks of blood with bowel movement would not be unusual.  b. Severe abdominal pain  c. Fever or Chills  8. Polyp Removal - follow these additional instructions   a. Take Metamucil - 1 tablespoon in juice every morning for 3 days  b. No Aspirin, Advil, Aleve, Nuprin, Ibuprofen, or medications that contain these drugs for 2 weeks. Any additional instructions: Follow up in 3 weeks in office. Appointment is 7-14-17  At 9:15am.      Instructions given to Nikhil Carreno and other family members.   Instructions given by:  Dr. Boris Larsen

## 2017-06-22 NOTE — ANESTHESIA POSTPROCEDURE EVALUATION
Post-Anesthesia Evaluation and Assessment    Patient: Ruddy Baltazar MRN: [id number]  SSN: [id number]    YOB: 1950  Age: 78 y.o. Sex: female       Cardiovascular Function/Vital Signs  Visit Vitals    /70    Pulse 66    Temp 36 °C (96.8 °F)    Resp 18    SpO2 94%    Breastfeeding No       Patient is status post total IV anesthesia anesthesia for Procedure(s):  COLONOSCOPY/ 30  ENDOSCOPIC POLYPECTOMY. Nausea/Vomiting: None    Postoperative hydration reviewed and adequate. Pain:  Pain Scale 1: Numeric (0 - 10) (06/22/17 0850)  Pain Intensity 1: 0 (06/22/17 0850)   Managed    Neurological Status: At baseline    Mental Status and Level of Consciousness: Alert and oriented     Pulmonary Status:   O2 Device: Room air (06/22/17 0850)   Adequate oxygenation and airway patent    Complications related to anesthesia: None    Post-anesthesia assessment completed.  No concerns    Signed By: Jessica Jorge MD     June 22, 2017

## 2017-06-22 NOTE — PROCEDURES
[address], [address] [facility]  [phone]    Colonoscopy Procedure Note    Name: Cristian Guadalupe     Date: 6/22/2017  Med Record Number: [id number]   Age: 78 y.o. Sex: female   Procedure: Colonoscopy with polypectomy (snare cautery)  Pre-operative Diagnosis:  H/o recent large sessile polypectomy for tubulovillous adenoma of descending colon (2 months ago)  Post-operative Diagnosis: sessile polyp of splenic flexure, no signs of prior polypectomy site  Indications: previous resection of high risk tubulovillous adenomatous polyp with short interval F/U  Anesthesia/Sedation: MAC IV MAC anesthesia Propofol  Procedure Details:    Informed consent was obtained for the procedure, including sedation. Risks of perforation, hemorrhage, adverse drug reaction and aspiration were discussed. The patient was placed in the left lateral decubitus position. Based on the pre-procedure assessment, including review of the patient's medical history, medications, allergies, and review of systems, she had been deemed to be an appropriate candidate for sedation by the Anesthesia Dept. The patient was monitored continuously with ECG tracing, pulse oximetry, blood pressure monitoring, and direct observations. A time out was performed. Once sedation was adequate, a rectal examination was performed. The [id number] was inserted into the rectum and advanced under direct vision to the cecum, which was identified by the ileocecal valve and appendiceal orifice. The quality of the colonic preparation was very good. A careful inspection was made as the colonoscope was withdrawn, including a retroflexed view of the rectum; findings and interventions are described below. Appropriate photodocumentation was obtained. Findings: ANUS: Anal exam reveals no masses. Residual hemorrhoids, sphincter tone is decreased but normal.   RECTUM: Rectal exam reveals no masses or hemorrhoids.    SIGMOID COLON: The mucosa is normal with good vascular pattern and without ulcers, diverticula, and polyps. DESCENDING COLON: The mucosa is normal with good vascular pattern and without ulcers, diverticula, and polyps. No signs of prior polypectomy site or persistence of polyp. SPLENIC FLEXURE: The distal splenic flexure contains a > 5mm sessile polyp which was removed with snare cautery with good resection base. TRANSVERSE COLON: The mucosa is normal with good vascular pattern and without ulcers, diverticula, and polyps. HEPATIC FLEXURE: The hepatic flexure is normal.   ASCENDING COLON: The mucosa is normal with good vascular pattern and without ulcers, diverticula, and polyps. CECUM: The appendiceal orifice appears normal. The ileocecal valve appears normal.   TERMINAL ILEUM: The terminal ileum was not entered. Specimens: splenic flexure sessile polyp    Estimated Blood Loss:  0-minimum           Complications: None; patient tolerated the procedure well. Attending Attestation: I performed the procedure. Impression:  See post-procedure diagnoses, sessile polyp    Recommendations:-Await pathology. , -Follow up with me., -post polypectomy precautions. , Will likely recommend colonoscopy in 1 year.   Will proceed with ventral hernia repair    Roseline Desir MD, FACS

## 2017-06-22 NOTE — ANESTHESIA PREPROCEDURE EVALUATION
Anesthetic History   No history of anesthetic complications            Review of Systems / Medical History  Patient summary reviewed, nursing notes reviewed and pertinent labs reviewed    Pulmonary  Within defined limits                 Neuro/Psych   Within defined limits           Cardiovascular    Hypertension: well controlled              Exercise tolerance: >4 METS     GI/Hepatic/Renal  Within defined limits              Endo/Other    Diabetes    Obesity     Other Findings            Physical Exam    Airway  Mallampati: II  TM Distance: 4 - 6 cm  Neck ROM: normal range of motion   Mouth opening: Normal     Cardiovascular    Rhythm: regular  Rate: normal         Dental    Dentition: Poor dentition     Pulmonary  Breath sounds clear to auscultation               Abdominal  GI exam deferred       Other Findings            Anesthetic Plan    ASA: 2  Anesthesia type: total IV anesthesia          Induction: Intravenous  Anesthetic plan and risks discussed with: Patient      Discussed TIVA with its benefits (lower risk of nausea and sore throat, etc.) and risks including possible awareness, patient understands and elects to proceed

## 2017-06-22 NOTE — IP AVS SNAPSHOT
Sebastian White 
 
 
 [address] 
[phone] Patient: Nguyen Kemp MRN: [id number] M:[birth year] You are allergic to the following No active allergies Recent Documentation Breastfeeding? OB Status Smoking Status No Postmenopausal Never Smoker Emergency Contacts Name Discharge Info Relation Home Work Mobile [facility] DISCHARGE CAREGIVER [3] Son [22] [phone] About your hospitalization You were admitted on:  June 22, 2017 You last received care in the:  [facility] You were discharged on:  June 22, 2017 Unit phone number:  [phone] Why you were hospitalized Your primary diagnosis was:  Not on File Providers Seen During Your Hospitalizations Provider Role Specialty Primary office phone Manny Rankin MD Attending Provider General Surgery [phone] Your Primary Care Physician (PCP) Primary Care Physician Office Phone Office Fax Dago Mcpherson [phone] Follow-up Information None Your Appointments Friday July 14, 2017  9:15 AM EDT Follow Up with Manny Rankin MD  
[facility], located within [facility] ([facility]) [address] [place] [address]  
[phone] Current Discharge Medication List  
  
ASK your doctor about these medications Dose & Instructions Dispensing Information Comments Morning Noon Evening Bedtime  
 gabapentin 300 mg capsule Commonly known as:  NEURONTIN Your last dose was: Your next dose is:    
   
   
 Dose:  300 mg Take 1 Cap by mouth nightly as needed. Quantity:  90 Cap Refills:  3  
     
   
   
   
  
 glipiZIDE 10 mg tablet Commonly known as:  Kyle Jacome Your last dose was: Your next dose is:    
   
   
 Dose:  10 mg Take 1 Tab by mouth daily. Quantity:  90 Tab Refills:  3  
     
   
   
   
  
 lisinopril 10 mg tablet Commonly known as:  Venessa Barrier Your last dose was: Your next dose is:    
   
   
 Dose:  10 mg Take 1 Tab by mouth daily. Quantity:  90 Tab Refills:  3  
     
   
   
   
  
 metFORMIN 500 mg tablet Commonly known as:  GLUCOPHAGE Your last dose was: Your next dose is: TAKE TWO TABLETS BY MOUTH TWICE DAILY Quantity:  360 Tab Refills:  3  
     
   
   
   
  
 TYLENOL 325 mg tablet Generic drug:  acetaminophen Your last dose was: Your next dose is:    
   
   
 Dose:  650 mg Take 650 mg by mouth every four (4) hours as needed for Pain. Refills:  0 Discharge Instructions Gastrointestinal Colonoscopy/Flexible Sigmoidoscopy - Lower Exam Discharge Instructions 1. Call Dr. Tere Fragoso for any problems or questions. 2. Contact the doctors office for follow up appointment as directed 3. Medication may cause drowsiness for several hours, therefore, do not drive or operate machinery for remainder of the day. 4. No alcohol today. 5. Ordinarily, you may resume regular diet and activity after exam unless otherwise specified by your physician. 6. Because of air put into your colon during exam, you may experience some abdominal distension, relieved by the passage of gas, for several hours. 7. Contact your physician if you have any of the following: 
a. Excessive amount of bleeding  large amount when having a bowel movement. Occasional specks of blood with bowel movement would not be unusual. 
b. Severe abdominal pain 
c. Fever or Chills 8. Polyp Removal  follow these additional instructions  
a. Take Metamucil  1 tablespoon in juice every morning for 3 days b. No Aspirin, Advil, Aleve, Nuprin, Ibuprofen, or medications that contain these drugs for 2 weeks. Any additional instructions: Follow up in 3 weeks in office.  Appointment is 7-14-17  At 9:15am. 
 
 
Instructions given to Placido Palomino and other family members. Instructions given by:  Dr. Natalio Wong Discharge Orders None ACO Transitions of Care Introducing [facility] [address] offers a voluntary care coordination program to provide high quality service and care to [facility] fee-for-service beneficiaries. Koby Arriaza was designed to help you enhance your health and well-being through the following services: ? Transitions of Care  support for individuals who are transitioning from one care setting to another (example: Hospital to home). ? Chronic and Complex Care Coordination  support for individuals and caregivers of those with serious or chronic illnesses or with more than one chronic (ongoing) condition and those who take a number of different medications. If you meet specific medical criteria, a [facility] [address] may call you directly to coordinate your care with your primary care physician and your other care providers. For questions about the [facility] programs, please, contact your physicians office. For general questions or additional information about Accountable Care Organizations: 
Please visit www.medicare.gov/acos. html or call 1-800-MEDICARE (0-456.597.9295) TTY users should call 4-228.627.3485. Introducing [facility]! [facility] introduces [employer]nerator patient portal. Now you can access parts of your medical record, email your doctor's office, and request medication refills online. 1. In your internet browser, go to [email or web address]://[email or web address]. [employer] 2. Click on the First Time User? Click Here link in the Sign In box. You will see the New Member Sign Up page. 3. Enter your Learnerator Access Code exactly as it appears below. You will not need to use this code after youve completed the sign-up process.  If you do not sign up before the expiration date, you must request a new code. · Visys Access Code: [email or web address] Expires: 8/12/2017  6:52 PM 
 
4. Enter the last four digits of your Social Security Number (xxxx) and Date of Birth (mm/dd/yyyy) as indicated and click Submit. You will be taken to the next sign-up page. 5. Create a Visys ID. This will be your Visys login ID and cannot be changed, so think of one that is secure and easy to remember. 6. Create a Visys password. You can change your password at any time. 7. Enter your Password Reset Question and Answer. This can be used at a later time if you forget your password. 8. Enter your e-mail address. You will receive e-mail notification when new information is available in [address] 9. Click Sign Up. You can now view and download portions of your medical record. 10. Click the Download Summary menu link to download a portable copy of your medical information. If you have questions, please visit the Frequently Asked Questions section of the Visys website. Remember, Visys is NOT to be used for urgent needs. For medical emergencies, dial 911. Now available from your iPhone and Android! General Information Please provide this summary of care documentation to your next provider. Patient Signature:  ____________________________________________________________ Date:  ____________________________________________________________  
  
Reinaldo Search Provider Signature:  ____________________________________________________________ Date:  ____________________________________________________________

## 2017-06-22 NOTE — INTERVAL H&P NOTE
H&P Update:  Sarah May was seen and examined. History and physical has been reviewed. The patient has been examined.  There have been no significant clinical changes since the completion of the originally dated History and Physical.    Signed By: Roseline Desir MD     June 22, 2017 7:11 AM

## 2017-06-22 NOTE — H&P
[facility]  [facility]. [address] [place] [address]  [phone]    H&P Note   Meryle Bolognese   MRN: [id number]     : 1950        HPI: Meryle Bolognese is a 78 y.o. female who returns after her first colonoscopy on 2017. She tolerated the procedure well but did have abdominal pain for the next 2 days. She was able to return to work on that Saturday without difficulty. She has turned into a even more complex patient. I had recommended colonoscopy, since she had never had one, prior to embarking on a complex hernia repair. She unfortunately had a greater than 10 mm sessile polyp in the descending colon. This required a saline lift technique and a barbed snare for resection. 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 No other lesions are identified. Other smaller flat lesions could be missed. This high risk adenoma with complex resection will require early surveillance. I would normally re-scope these individuals in 6 months. Given her need for hernia surgery, we will move up that surveillance to 2 months. The surveillance would be to ensure complete removal of the lesion. Once complete excision is confirmed she would have an interval colonoscopy performed in 3 years. If we are not able to completely remove this lesion, she may require partial colectomy. Of course her lesion is close to the splenic flexure, which is the most difficult part of the colon to remove. She had CT scan evaluation of her abdominal wall which confirms her physical exam.  She has a 3.28 cm periumbilical defect that contains incarcerated fat and no bowel. She has a wide rectus diastasis and severe denervation of the R side of her abdominal wall with very little abdominal wall muscle until far lateral.  Select images are below. She also presents to discuss colonoscopy. She is past due and should be cleared prior to any hernia repair.   She has normal daily BMs and denies seeing and blood or mucous per rectum. She has no FH for colon cancer or polyps. She was referred by Dr. Jassi Swanson for ventral hernia. The patient is a very pleasant woman who had gallbladder surgery ~5 years ago. It was not performed at [facility]. She had attempt at laparoscopic but was converted to an open large R subcostal incision. She noticed a bulge ~4 years ago. She denies obstructive symptoms but she has been experiencing pain with lifting. 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 She would like a repair but would like to delay until the Spring flower season has calmed down. She is also referred for colonoscopy. Of note her   of stage IV colon cancer that was diagnosed in his 62s. Her son has already had an initial colonoscopy. Past Medical History:   Diagnosis Date    Diabetes ([facility]     Hypertension     controlled with med-denies SOB with 1 flight step    Tubulovillous adenoma of colon 2017    Type 2 diabetes mellitus with diabetic polyneuropathy, without long-term current use of insulin ([facility] 2017    type 2; does not check glucose- last A1C= 8.9 on 2/15/17    Type II or unspecified type diabetes mellitus without mention of complication, not stated as uncontrolled 2015    avg-150-no hypo symptoms     Past Surgical History:   Procedure Laterality Date    COLONOSCOPY N/A 2017    COLONOSCOPY  BMI 29 performed by Raeann Grant MD at [address] [place] [address]       No current facility-administered medications for this encounter. Current Outpatient Prescriptions   Medication Sig    acetaminophen (TYLENOL) 325 mg tablet Take 650 mg by mouth every four (4) hours as needed for Pain.  metFORMIN (GLUCOPHAGE) 500 mg tablet TAKE TWO TABLETS BY MOUTH TWICE DAILY (Patient taking differently: Take 1,000 mg by mouth two (2) times daily (with meals).  TAKE TWO TABLETS BY MOUTH TWICE DAILY)    gabapentin (NEURONTIN) 300 mg capsule Take 1 Cap by mouth nightly as needed.  lisinopril (PRINIVIL, ZESTRIL) 10 mg tablet Take 1 Tab by mouth daily. (Patient taking differently: Take 10 mg by mouth every morning.)    glipiZIDE (GLUCOTROL) 10 mg tablet Take 1 Tab by mouth daily. (Patient taking differently: Take 10 mg by mouth every morning.)     ALLERGIES:  Review of patient's allergies indicates no known allergies. Social History     Social History    Marital status:      Spouse name: N/A    Number of children: N/A    Years of education: N/A     Social History Main Topics    Smoking status: Never Smoker    Smokeless tobacco: Never Used    Alcohol use No    Drug use: No    Sexual activity: Not Currently     Other Topics Concern    Not on file     Social History Narrative     History   Smoking Status    Never Smoker   Smokeless Tobacco    Never Used     Family History   Problem Relation Age of Onset    Diabetes Mother     Cancer Father     Stroke Sister    Chuy Taylor Sister     Cancer Sister     Diabetes Sister     Diabetes Sister     Heart Disease Sister      no MI- stent    Lung Disease Brother     Diabetes Brother        ROS: The patient has no difficulty with chest pain or shortness of breath. No fever or chills. The patient denies any personal or family history of abnormal clotting or bleeding. Comprehensive review of systems was otherwise unremarkable except as noted above. Physical Exam:   Constitutional: Alert oriented cooperative patient in no acute distress. Visit Vitals    /80    Pulse 71    Ht 5' 3\" (1.6 m)    Wt 173 lb (78.5 kg)    SpO2 98%    BMI 30.65 kg/m2   Eyes:Sclera are clear without icterus. ENMT: no obvious neck masses, no ear or lip lesions  CV: RRR. Normal perfusion  Resp: No JVD. Breathing is  non-labored. GI: obese, soft and non-distended, obvious bulge to L of umbilicus.   Wide rectus diastasis, 2-3 cm defect associated with periumbilical incision scar with nonreducible bulging to left that does not feel to contain obvious bowel. Large and wide R subcostal incision with classic denervation pattern with atrophy and non contraction of R rectus abdominus inferior to the scar. The right side bulges with abdominal wall contraction. Musculoskeletal: unremarkable with normal function. Neuro:  No obvious focal deficits  Psychiatric: normal affect and mood, no memory impairment    No results found for: WBC, WBCLT, HGB, HGBP, HCT, PHCT, RBCH, PLT, MCV, HGBEXT, HCTEXT, PLTEXT, HGBEXT, HCTEXT, PLTEXT    Lab Results   Component Value Date/Time    Sodium 142 02/15/2017 10:14 AM    Potassium 4.6 02/15/2017 10:14 AM    Chloride 100 02/15/2017 10:14 AM    CO2 24 02/15/2017 10:14 AM    BUN 13 02/15/2017 10:14 AM    Creatinine 0.67 02/15/2017 10:14 AM    Glucose 176 02/15/2017 10:14 AM    Bilirubin, total 0.4 02/15/2017 10:14 AM    AST (SGOT) 17 02/15/2017 10:14 AM    Alk. phosphatase 91 02/15/2017 10:14 AM     I reviewed her CT images    CT ABDOMEN AND PELVIS WITH CONTRAST: 3/14/2017  HISTORY: evaluate incisional abdominal wall hernias and rectus diastasis;  evaluate incisional abdominal wall hernias and rectus diastasis, eval incisional  abd wall hernia lt abd. Pain worsening over past month. gb removed, no hx cancer  COMPARISON: None available  TECHNIQUE: Oral contrast was administered. 125 cc of nonionic intravenous  contrast was injected, and axial helical CT images were obtained from above the  diaphragm through the pelvis. Coronal reformatted images were obtained at the  scanner console and made available for review. Radiation dose reduction  techniques were used for this study: Our CT scanners use one or all of the  following: Automated exposure control, adjustment of the mA and/or kVp according  to patient's size, iterative reconstruction. FINDINGS:  ABDOMEN:  Minimal dependent subsegmental atelectasis bilateral lung bases.  Evidence of  cholecystectomy. There is a small fluid collection in the gallbladder fossa  measuring approximately 2.1 x 3.0 cm, most likely representing a small  postoperative seroma. Normal-appearing liver, spleen, pancreas, bilateral  adrenal glands and kidneys. Mild calcific atherosclerosis of a normal caliber  abdominal aorta. No evidence of significant lymphadenopathy. Small duodenal  diverticulum. Normal-appearing small bowel. No evidence of intraperitoneal  free air or free fluid. There is a moderate-sized fat-containing periumbilical  hernia, with a narrow neck measuring approximately 2.8 cm in diameter. PELVIS:  Normal-appearing urinary bladder. Normal-appearing uterus and bilateral adnexa. Normal-appearing colon and appendix. No evidence of pelvic free fluid. No  evidence of significant inguinal or pelvic sidewall lymphadenopathy. Visualized osseous structures unremarkable. IMPRESSION:   1. There is a moderate-sized fat-containing periumbilical hernia. 2. Other incidental findings as above.              Assessment/Plan:     Jhonathan Arcos is a 78 y.o. female who has signs and symptoms consistent with a periumbilical ventral incisional hernia from a trocar site that is complicated by R sided abdominal wall denervation and wide diastasis of the rectus muscles. She is also obese and has diabetes. The fascial defect is of moderate size but carries risk of bowel incarceration and obstruction. Unfortunately she is high risk for failure of the repair as well. She will require a mesh repair which carries a greater complication rate but has the greatest percentage to be the only durable repair. She will require an oversized mesh due to her poor surrounding tissue. 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   CT scan confirms her physical findings and is not at immediate risk for bowel incarceration. We attempted to clear her colon prior to any hernia surgery, but encountered a high risk sessile tubulovillous adenoma that required complex excision. This will require early surveillance. We have agreed to proceed with colonoscopy in 2 months. If complete excision is confirmed then we can proceed with scheduling her hernia repair this summer. We discussed proceeding again with colonoscopy. I discussed the patient's condition and treatment options with the patient. I discussed risks of colonoscopy in language the patient could understand including bleeding, infection, aspiration, perforation, medication reaction, need for further endoscopy or surgery, abscess, fistula, SBO, DVT, PE, heart attack, stroke, renal failure, respiratory failure, ventilatory dependence, and death. The patient voiced understanding of all this and all questions were answered. Alternatives to colonoscopy were discussed also and risks of the alternatives. The patient requested that we proceed with colonoscopy. Informed consent was obtained. Problem List  Date Reviewed: 4/28/2017          Codes Class Noted    Tubulovillous adenoma of colon ICD-10-CM: D12.6  ICD-9-CM: 211.3  4/28/2017    Overview Signed 4/28/2017  3:47 PM by Saw Cabrera MD     Initial colonoscopy with >10 mm sessile polypectomy from descending colon 4/20/2017  Diagnosis:   LEFT COLON POLYP: FRAGMENTS OF MIXED TUBULOVILLOUS ADENOMA. Electronically signed out on 4/21/2017 10:07 by BELLE Grant M.D.               Incisional hernia with obstruction but no gangrene ICD-10-CM: K43.0  ICD-9-CM: 552.21  2/27/2017        Diastasis of rectus abdominis ICD-10-CM: M62.08  ICD-9-CM: 728.84  2/27/2017        Obesity, Class I, BMI 30.0-34.9 (see actual BMI) ICD-10-CM: E66.9  ICD-9-CM: 278.00  2/27/2017        Type 2 diabetes mellitus with diabetic polyneuropathy, without long-term current use of insulin ([facility] [address] ICD-10-CM: E11.42  ICD-9-CM: 250.60, 357.2  2/15/2017        Type II diabetes mellitus ([facility] ICD-10-CM: E11.9  ICD-9-CM: 250.00  8/5/2015        Essential hypertension, benign ICD-10-CM: I10  ICD-9-CM: 401.1  8/5/2015                Ivonne Guajardo MD,  FACS

## 2017-07-18 RX ORDER — CEFAZOLIN SODIUM IN 0.9 % NACL 2 G/50 ML
2 INTRAVENOUS SOLUTION, PIGGYBACK (ML) INTRAVENOUS ONCE
Status: CANCELLED | OUTPATIENT
Start: 2017-08-08 | End: 2017-08-08

## 2017-08-01 ENCOUNTER — HOSPITAL ENCOUNTER (OUTPATIENT)
Dept: SURGERY | Age: 67
Discharge: HOME | End: 2017-08-01
Payer: MEDICARE

## 2017-08-01 VITALS
RESPIRATION RATE: 16 BRPM | BODY MASS INDEX: 32.6 KG/M2 | TEMPERATURE: 97.7 F | WEIGHT: 184 LBS | SYSTOLIC BLOOD PRESSURE: 181 MMHG | HEIGHT: 63 IN | DIASTOLIC BLOOD PRESSURE: 73 MMHG | OXYGEN SATURATION: 97 % | HEART RATE: 69 BPM

## 2017-08-01 LAB
ATRIAL RATE: 63 BPM
CALCULATED P AXIS, ECG09: 53 DEGREES
CALCULATED R AXIS, ECG10: 51 DEGREES
CALCULATED T AXIS, ECG11: 64 DEGREES
DIAGNOSIS, 93000: NORMAL
GLUCOSE BLD STRIP.AUTO-MCNC: 90 MG/DL (ref 65–100)
HGB BLD-MCNC: 13.1 G/DL (ref 11.7–15.4)
P-R INTERVAL, ECG05: 214 MS
Q-T INTERVAL, ECG07: 402 MS
QRS DURATION, ECG06: 74 MS
QTC CALCULATION (BEZET), ECG08: 411 MS
VENTRICULAR RATE, ECG03: 63 BPM

## 2017-08-01 PROCEDURE — 93005 ELECTROCARDIOGRAM TRACING: CPT | Performed by: ANESTHESIOLOGY

## 2017-08-01 PROCEDURE — 82962 GLUCOSE BLOOD TEST: CPT

## 2017-08-01 PROCEDURE — 85018 HEMOGLOBIN: CPT | Performed by: ANESTHESIOLOGY

## 2017-08-01 NOTE — PERIOP NOTES
Patient verified name, , and surgery as listed in [facility]. Patient provided medical/health information and PTA medications to the best of their ability. TYPE  CASE:2  Orders per surgeon: yes Received  Labs per surgeon:none. Results: -  Labs per anesthesia protocol: hgb,poc glucose. Results -  EKG  :  yes    Patient provided with and instructed on education handouts including Guide to Surgery, blood transfusions, pain management, and hand hygiene for the family and community, and [facility] brochure. Ita mist and instructions given per hospital policy. Instructed patient to continue previous medications as prescribed prior to surgery unless otherwise directed and to take the following medications the day of surgery according to anesthesia guidelines : tylenol as needed . Instructed patient to hold  the following medications on the day of surgery: naprosyn . Original medication prescription bottles none visualized during patient appointment. Patient teach back successful and patient demonstrates knowledge of instruction.

## 2017-08-07 ENCOUNTER — ANESTHESIA EVENT (OUTPATIENT)
Dept: SURGERY | Age: 67
End: 2017-08-07
Payer: MEDICARE

## 2017-08-08 ENCOUNTER — ANESTHESIA (OUTPATIENT)
Dept: SURGERY | Age: 67
End: 2017-08-08
Payer: MEDICARE

## 2017-08-08 ENCOUNTER — HOSPITAL ENCOUNTER (OUTPATIENT)
Age: 67
Setting detail: OBSERVATION
Discharge: HOME | End: 2017-08-09
Attending: SURGERY | Admitting: SURGERY
Payer: MEDICARE

## 2017-08-08 DIAGNOSIS — K43.0 INCISIONAL HERNIA WITH OBSTRUCTION BUT NO GANGRENE: Primary | ICD-10-CM

## 2017-08-08 PROBLEM — K46.0 INCARCERATED HERNIA: Status: ACTIVE | Noted: 2017-08-08

## 2017-08-08 LAB
GLUCOSE BLD STRIP.AUTO-MCNC: 105 MG/DL (ref 65–100)
GLUCOSE BLD STRIP.AUTO-MCNC: 166 MG/DL (ref 65–100)
GLUCOSE BLD STRIP.AUTO-MCNC: 168 MG/DL (ref 65–100)

## 2017-08-08 PROCEDURE — 74011250636 HC RX REV CODE- 250/636

## 2017-08-08 PROCEDURE — 77030032490 HC SLV COMPR SCD KNE COVD -B: Performed by: SURGERY

## 2017-08-08 PROCEDURE — 74011250636 HC RX REV CODE- 250/636: Performed by: SURGERY

## 2017-08-08 PROCEDURE — 77030002966 HC SUT PDS J&J -A: Performed by: SURGERY

## 2017-08-08 PROCEDURE — 77030020782 HC GWN BAIR PAWS FLX 3M -B: Performed by: ANESTHESIOLOGY

## 2017-08-08 PROCEDURE — 77030008703 HC TU ET UNCUF COVD -A: Performed by: ANESTHESIOLOGY

## 2017-08-08 PROCEDURE — 77030027744 HC PWDR HEMSTAT ARISTA ABSRB 5GM BARD -D: Performed by: SURGERY

## 2017-08-08 PROCEDURE — 77030013567 HC DRN WND RESERV BARD -A: Performed by: SURGERY

## 2017-08-08 PROCEDURE — 77030034849

## 2017-08-08 PROCEDURE — 74011000250 HC RX REV CODE- 250: Performed by: SURGERY

## 2017-08-08 PROCEDURE — 74011636637 HC RX REV CODE- 636/637: Performed by: SURGERY

## 2017-08-08 PROCEDURE — 77030008477 HC STYL SATN SLP COVD -A: Performed by: ANESTHESIOLOGY

## 2017-08-08 PROCEDURE — 76060000036 HC ANESTHESIA 2.5 TO 3 HR: Performed by: SURGERY

## 2017-08-08 PROCEDURE — 74011250636 HC RX REV CODE- 250/636: Performed by: ANESTHESIOLOGY

## 2017-08-08 PROCEDURE — 77030018836 HC SOL IRR NACL ICUM -A: Performed by: SURGERY

## 2017-08-08 PROCEDURE — 82962 GLUCOSE BLOOD TEST: CPT

## 2017-08-08 PROCEDURE — 77030018673: Performed by: SURGERY

## 2017-08-08 PROCEDURE — 77030036554: Performed by: SURGERY

## 2017-08-08 PROCEDURE — 76210000000 HC OR PH I REC 2 TO 2.5 HR: Performed by: SURGERY

## 2017-08-08 PROCEDURE — 74011250637 HC RX REV CODE- 250/637: Performed by: SURGERY

## 2017-08-08 PROCEDURE — 77030011640 HC PAD GRND REM COVD -A: Performed by: SURGERY

## 2017-08-08 PROCEDURE — 99218 HC RM OBSERVATION: CPT

## 2017-08-08 PROCEDURE — C1781 MESH (IMPLANTABLE): HCPCS | Performed by: SURGERY

## 2017-08-08 PROCEDURE — 74011000250 HC RX REV CODE- 250

## 2017-08-08 PROCEDURE — 77030012407 HC DRN WND BARD -B: Performed by: SURGERY

## 2017-08-08 PROCEDURE — 77030012935 HC DRSG AQUACEL BMS -B: Performed by: SURGERY

## 2017-08-08 PROCEDURE — 77030010507 HC ADH SKN DERMBND J&J -B: Performed by: SURGERY

## 2017-08-08 PROCEDURE — 76010000172 HC OR TIME 2.5 TO 3 HR INTENSV-TIER 1: Performed by: SURGERY

## 2017-08-08 PROCEDURE — 77030034849: Performed by: SURGERY

## 2017-08-08 PROCEDURE — 77030033067 HC SUT PDO STRATFX SPIR J&J -B: Performed by: SURGERY

## 2017-08-08 PROCEDURE — 77030031139 HC SUT VCRL2 J&J -A: Performed by: SURGERY

## 2017-08-08 DEVICE — PHASIX MESH, 4" X 6" (10.2 CM X 15.2 CM), RECTANGLE
Type: IMPLANTABLE DEVICE | Site: ABDOMEN | Status: FUNCTIONAL
Brand: PHASIX

## 2017-08-08 RX ORDER — CEFAZOLIN SODIUM IN 0.9 % NACL 2 G/50 ML
2 INTRAVENOUS SOLUTION, PIGGYBACK (ML) INTRAVENOUS ONCE
Status: COMPLETED | OUTPATIENT
Start: 2017-08-08 | End: 2017-08-08

## 2017-08-08 RX ORDER — ONDANSETRON 2 MG/ML
4 INJECTION INTRAMUSCULAR; INTRAVENOUS
Status: DISCONTINUED | OUTPATIENT
Start: 2017-08-08 | End: 2017-08-09 | Stop reason: HOSPADM

## 2017-08-08 RX ORDER — LIDOCAINE HYDROCHLORIDE 10 MG/ML
0.1 INJECTION INFILTRATION; PERINEURAL AS NEEDED
Status: DISCONTINUED | OUTPATIENT
Start: 2017-08-08 | End: 2017-08-08 | Stop reason: HOSPADM

## 2017-08-08 RX ORDER — MORPHINE SULFATE 10 MG/ML
2-6 INJECTION, SOLUTION INTRAMUSCULAR; INTRAVENOUS
Status: DISCONTINUED | OUTPATIENT
Start: 2017-08-08 | End: 2017-08-08 | Stop reason: SDUPTHER

## 2017-08-08 RX ORDER — SODIUM CHLORIDE, SODIUM LACTATE, POTASSIUM CHLORIDE, CALCIUM CHLORIDE 600; 310; 30; 20 MG/100ML; MG/100ML; MG/100ML; MG/100ML
75 INJECTION, SOLUTION INTRAVENOUS CONTINUOUS
Status: DISCONTINUED | OUTPATIENT
Start: 2017-08-08 | End: 2017-08-09 | Stop reason: HOSPADM

## 2017-08-08 RX ORDER — FAMOTIDINE 10 MG/ML
20 INJECTION INTRAVENOUS EVERY 12 HOURS
Status: DISCONTINUED | OUTPATIENT
Start: 2017-08-08 | End: 2017-08-09 | Stop reason: HOSPADM

## 2017-08-08 RX ORDER — NEOSTIGMINE METHYLSULFATE 1 MG/ML
INJECTION INTRAVENOUS AS NEEDED
Status: DISCONTINUED | OUTPATIENT
Start: 2017-08-08 | End: 2017-08-08 | Stop reason: HOSPADM

## 2017-08-08 RX ORDER — SODIUM CHLORIDE, SODIUM LACTATE, POTASSIUM CHLORIDE, CALCIUM CHLORIDE 600; 310; 30; 20 MG/100ML; MG/100ML; MG/100ML; MG/100ML
100 INJECTION, SOLUTION INTRAVENOUS CONTINUOUS
Status: DISCONTINUED | OUTPATIENT
Start: 2017-08-08 | End: 2017-08-08 | Stop reason: HOSPADM

## 2017-08-08 RX ORDER — SUCCINYLCHOLINE CHLORIDE 20 MG/ML
INJECTION INTRAMUSCULAR; INTRAVENOUS AS NEEDED
Status: DISCONTINUED | OUTPATIENT
Start: 2017-08-08 | End: 2017-08-08 | Stop reason: HOSPADM

## 2017-08-08 RX ORDER — OXYCODONE HYDROCHLORIDE 5 MG/1
5 TABLET ORAL
Status: DISCONTINUED | OUTPATIENT
Start: 2017-08-08 | End: 2017-08-08 | Stop reason: HOSPADM

## 2017-08-08 RX ORDER — MIDAZOLAM HYDROCHLORIDE 1 MG/ML
2 INJECTION, SOLUTION INTRAMUSCULAR; INTRAVENOUS
Status: DISCONTINUED | OUTPATIENT
Start: 2017-08-08 | End: 2017-08-08 | Stop reason: HOSPADM

## 2017-08-08 RX ORDER — MORPHINE SULFATE 2 MG/ML
2 INJECTION, SOLUTION INTRAMUSCULAR; INTRAVENOUS
Status: DISCONTINUED | OUTPATIENT
Start: 2017-08-08 | End: 2017-08-09 | Stop reason: HOSPADM

## 2017-08-08 RX ORDER — ONDANSETRON 4 MG/1
4 TABLET, ORALLY DISINTEGRATING ORAL
Qty: 30 TAB | Refills: 0 | Status: SHIPPED | OUTPATIENT
Start: 2017-08-08 | End: 2017-08-11

## 2017-08-08 RX ORDER — DOCUSATE SODIUM 100 MG/1
100 CAPSULE, LIQUID FILLED ORAL 2 TIMES DAILY
Status: DISCONTINUED | OUTPATIENT
Start: 2017-08-08 | End: 2017-08-09 | Stop reason: HOSPADM

## 2017-08-08 RX ORDER — KETOROLAC TROMETHAMINE 30 MG/ML
INJECTION, SOLUTION INTRAMUSCULAR; INTRAVENOUS AS NEEDED
Status: DISCONTINUED | OUTPATIENT
Start: 2017-08-08 | End: 2017-08-08 | Stop reason: HOSPADM

## 2017-08-08 RX ORDER — FENTANYL CITRATE 50 UG/ML
INJECTION, SOLUTION INTRAMUSCULAR; INTRAVENOUS AS NEEDED
Status: DISCONTINUED | OUTPATIENT
Start: 2017-08-08 | End: 2017-08-08 | Stop reason: HOSPADM

## 2017-08-08 RX ORDER — TRAZODONE HYDROCHLORIDE 50 MG/1
50 TABLET ORAL
Status: DISCONTINUED | OUTPATIENT
Start: 2017-08-08 | End: 2017-08-09 | Stop reason: HOSPADM

## 2017-08-08 RX ORDER — HYDROMORPHONE HYDROCHLORIDE 2 MG/ML
0.5 INJECTION, SOLUTION INTRAMUSCULAR; INTRAVENOUS; SUBCUTANEOUS
Status: DISCONTINUED | OUTPATIENT
Start: 2017-08-08 | End: 2017-08-08 | Stop reason: HOSPADM

## 2017-08-08 RX ORDER — ONDANSETRON 2 MG/ML
INJECTION INTRAMUSCULAR; INTRAVENOUS AS NEEDED
Status: DISCONTINUED | OUTPATIENT
Start: 2017-08-08 | End: 2017-08-08 | Stop reason: HOSPADM

## 2017-08-08 RX ORDER — PROPOFOL 10 MG/ML
INJECTION, EMULSION INTRAVENOUS AS NEEDED
Status: DISCONTINUED | OUTPATIENT
Start: 2017-08-08 | End: 2017-08-08 | Stop reason: HOSPADM

## 2017-08-08 RX ORDER — HYDROCODONE BITARTRATE AND ACETAMINOPHEN 5; 325 MG/1; MG/1
1-2 TABLET ORAL
Status: DISCONTINUED | OUTPATIENT
Start: 2017-08-08 | End: 2017-08-09 | Stop reason: HOSPADM

## 2017-08-08 RX ORDER — FENTANYL CITRATE 50 UG/ML
100 INJECTION, SOLUTION INTRAMUSCULAR; INTRAVENOUS ONCE
Status: DISCONTINUED | OUTPATIENT
Start: 2017-08-08 | End: 2017-08-08 | Stop reason: HOSPADM

## 2017-08-08 RX ORDER — MORPHINE SULFATE 4 MG/ML
4 INJECTION, SOLUTION INTRAMUSCULAR; INTRAVENOUS
Status: DISCONTINUED | OUTPATIENT
Start: 2017-08-08 | End: 2017-08-09 | Stop reason: HOSPADM

## 2017-08-08 RX ORDER — ROCURONIUM BROMIDE 10 MG/ML
INJECTION, SOLUTION INTRAVENOUS AS NEEDED
Status: DISCONTINUED | OUTPATIENT
Start: 2017-08-08 | End: 2017-08-08 | Stop reason: HOSPADM

## 2017-08-08 RX ORDER — LIDOCAINE HYDROCHLORIDE 20 MG/ML
INJECTION, SOLUTION EPIDURAL; INFILTRATION; INTRACAUDAL; PERINEURAL AS NEEDED
Status: DISCONTINUED | OUTPATIENT
Start: 2017-08-08 | End: 2017-08-08 | Stop reason: HOSPADM

## 2017-08-08 RX ORDER — DOCUSATE SODIUM 100 MG/1
100 CAPSULE, LIQUID FILLED ORAL 2 TIMES DAILY
Qty: 60 CAP | Refills: 0 | Status: SHIPPED | OUTPATIENT
Start: 2017-08-08 | End: 2017-09-07

## 2017-08-08 RX ORDER — DIPHENHYDRAMINE HYDROCHLORIDE 50 MG/ML
12.5-25 INJECTION, SOLUTION INTRAMUSCULAR; INTRAVENOUS
Status: DISCONTINUED | OUTPATIENT
Start: 2017-08-08 | End: 2017-08-09 | Stop reason: HOSPADM

## 2017-08-08 RX ORDER — MORPHINE SULFATE 10 MG/ML
6 INJECTION, SOLUTION INTRAMUSCULAR; INTRAVENOUS
Status: DISCONTINUED | OUTPATIENT
Start: 2017-08-08 | End: 2017-08-09 | Stop reason: HOSPADM

## 2017-08-08 RX ORDER — CEFAZOLIN SODIUM 1 G/3ML
INJECTION, POWDER, FOR SOLUTION INTRAMUSCULAR; INTRAVENOUS AS NEEDED
Status: DISCONTINUED | OUTPATIENT
Start: 2017-08-08 | End: 2017-08-08 | Stop reason: HOSPADM

## 2017-08-08 RX ORDER — MIDAZOLAM HYDROCHLORIDE 1 MG/ML
2 INJECTION, SOLUTION INTRAMUSCULAR; INTRAVENOUS ONCE
Status: DISCONTINUED | OUTPATIENT
Start: 2017-08-08 | End: 2017-08-08 | Stop reason: HOSPADM

## 2017-08-08 RX ORDER — ACETAMINOPHEN 10 MG/ML
1000 INJECTION, SOLUTION INTRAVENOUS ONCE
Status: COMPLETED | OUTPATIENT
Start: 2017-08-08 | End: 2017-08-08

## 2017-08-08 RX ORDER — ENOXAPARIN SODIUM 100 MG/ML
40 INJECTION SUBCUTANEOUS EVERY 24 HOURS
Status: DISCONTINUED | OUTPATIENT
Start: 2017-08-09 | End: 2017-08-09 | Stop reason: HOSPADM

## 2017-08-08 RX ORDER — HYDROCODONE BITARTRATE AND ACETAMINOPHEN 5; 325 MG/1; MG/1
TABLET ORAL
Qty: 40 TAB | Refills: 0 | Status: SHIPPED | OUTPATIENT
Start: 2017-08-08 | End: 2017-09-19

## 2017-08-08 RX ORDER — GLYCOPYRROLATE 0.2 MG/ML
INJECTION INTRAMUSCULAR; INTRAVENOUS AS NEEDED
Status: DISCONTINUED | OUTPATIENT
Start: 2017-08-08 | End: 2017-08-08 | Stop reason: HOSPADM

## 2017-08-08 RX ORDER — CEFAZOLIN SODIUM IN 0.9 % NACL 2 G/50 ML
2 INTRAVENOUS SOLUTION, PIGGYBACK (ML) INTRAVENOUS
Status: DISCONTINUED | OUTPATIENT
Start: 2017-08-08 | End: 2017-08-08 | Stop reason: ALTCHOICE

## 2017-08-08 RX ADMIN — ROCURONIUM BROMIDE 7 MG: 10 INJECTION, SOLUTION INTRAVENOUS at 10:43

## 2017-08-08 RX ADMIN — PROPOFOL 150 MG: 10 INJECTION, EMULSION INTRAVENOUS at 09:27

## 2017-08-08 RX ADMIN — ROCURONIUM BROMIDE 5 MG: 10 INJECTION, SOLUTION INTRAVENOUS at 09:27

## 2017-08-08 RX ADMIN — SODIUM CHLORIDE, SODIUM LACTATE, POTASSIUM CHLORIDE, AND CALCIUM CHLORIDE 75 ML/HR: 600; 310; 30; 20 INJECTION, SOLUTION INTRAVENOUS at 20:54

## 2017-08-08 RX ADMIN — SODIUM CHLORIDE, SODIUM LACTATE, POTASSIUM CHLORIDE, AND CALCIUM CHLORIDE 100 ML/HR: 600; 310; 30; 20 INJECTION, SOLUTION INTRAVENOUS at 07:31

## 2017-08-08 RX ADMIN — SUCCINYLCHOLINE CHLORIDE 160 MG: 20 INJECTION INTRAMUSCULAR; INTRAVENOUS at 09:27

## 2017-08-08 RX ADMIN — SODIUM CHLORIDE, SODIUM LACTATE, POTASSIUM CHLORIDE, AND CALCIUM CHLORIDE: 600; 310; 30; 20 INJECTION, SOLUTION INTRAVENOUS at 10:23

## 2017-08-08 RX ADMIN — KETOROLAC TROMETHAMINE 15 MG: 30 INJECTION, SOLUTION INTRAMUSCULAR; INTRAVENOUS at 11:45

## 2017-08-08 RX ADMIN — FENTANYL CITRATE 25 MCG: 50 INJECTION, SOLUTION INTRAMUSCULAR; INTRAVENOUS at 12:00

## 2017-08-08 RX ADMIN — FENTANYL CITRATE 25 MCG: 50 INJECTION, SOLUTION INTRAMUSCULAR; INTRAVENOUS at 12:05

## 2017-08-08 RX ADMIN — ACETAMINOPHEN 1000 MG: 10 INJECTION, SOLUTION INTRAVENOUS at 13:00

## 2017-08-08 RX ADMIN — ROCURONIUM BROMIDE 20 MG: 10 INJECTION, SOLUTION INTRAVENOUS at 09:40

## 2017-08-08 RX ADMIN — ONDANSETRON 4 MG: 2 INJECTION INTRAMUSCULAR; INTRAVENOUS at 15:42

## 2017-08-08 RX ADMIN — FENTANYL CITRATE 50 MCG: 50 INJECTION, SOLUTION INTRAMUSCULAR; INTRAVENOUS at 10:15

## 2017-08-08 RX ADMIN — LIDOCAINE HYDROCHLORIDE 20 MG: 20 INJECTION, SOLUTION EPIDURAL; INFILTRATION; INTRACAUDAL; PERINEURAL at 09:27

## 2017-08-08 RX ADMIN — TRAZODONE HYDROCHLORIDE 50 MG: 50 TABLET ORAL at 21:20

## 2017-08-08 RX ADMIN — HYDROCODONE BITARTRATE AND ACETAMINOPHEN 1 TABLET: 5; 325 TABLET ORAL at 15:42

## 2017-08-08 RX ADMIN — HYDROMORPHONE HYDROCHLORIDE 0.5 MG: 2 INJECTION, SOLUTION INTRAMUSCULAR; INTRAVENOUS; SUBCUTANEOUS at 13:34

## 2017-08-08 RX ADMIN — SODIUM CHLORIDE, SODIUM LACTATE, POTASSIUM CHLORIDE, AND CALCIUM CHLORIDE: 600; 310; 30; 20 INJECTION, SOLUTION INTRAVENOUS at 09:17

## 2017-08-08 RX ADMIN — FENTANYL CITRATE 100 MCG: 50 INJECTION, SOLUTION INTRAMUSCULAR; INTRAVENOUS at 09:25

## 2017-08-08 RX ADMIN — FENTANYL CITRATE 50 MCG: 50 INJECTION, SOLUTION INTRAMUSCULAR; INTRAVENOUS at 12:02

## 2017-08-08 RX ADMIN — NEOSTIGMINE METHYLSULFATE 3 MG: 1 INJECTION INTRAVENOUS at 11:45

## 2017-08-08 RX ADMIN — GLYCOPYRROLATE 0.4 MG: 0.2 INJECTION INTRAMUSCULAR; INTRAVENOUS at 11:45

## 2017-08-08 RX ADMIN — FAMOTIDINE 20 MG: 10 INJECTION, SOLUTION INTRAVENOUS at 15:31

## 2017-08-08 RX ADMIN — ONDANSETRON 4 MG: 2 INJECTION INTRAMUSCULAR; INTRAVENOUS at 10:21

## 2017-08-08 RX ADMIN — CEFAZOLIN 2 G: 1 INJECTION, POWDER, FOR SOLUTION INTRAMUSCULAR; INTRAVENOUS; PARENTERAL at 09:34

## 2017-08-08 RX ADMIN — FENTANYL CITRATE 50 MCG: 50 INJECTION, SOLUTION INTRAMUSCULAR; INTRAVENOUS at 09:53

## 2017-08-08 RX ADMIN — DOCUSATE SODIUM 100 MG: 100 CAPSULE, LIQUID FILLED ORAL at 17:20

## 2017-08-08 RX ADMIN — INSULIN HUMAN 1 UNITS: 100 INJECTION, SOLUTION PARENTERAL at 17:47

## 2017-08-08 RX ADMIN — FAMOTIDINE 20 MG: 10 INJECTION, SOLUTION INTRAVENOUS at 21:13

## 2017-08-08 RX ADMIN — HYDROCODONE BITARTRATE AND ACETAMINOPHEN 1 TABLET: 5; 325 TABLET ORAL at 21:18

## 2017-08-08 NOTE — ANESTHESIA PREPROCEDURE EVALUATION
Anesthetic History   No history of anesthetic complications            Review of Systems / Medical History  Pertinent labs reviewed    Pulmonary  Within defined limits                 Neuro/Psych   Within defined limits           Cardiovascular    Hypertension              Exercise tolerance: >4 METS     GI/Hepatic/Renal  Within defined limits              Endo/Other    Diabetes: type 2    Obesity     Other Findings            Physical Exam    Airway  Mallampati: III  TM Distance: < 4 cm  Neck ROM: decreased range of motion   Mouth opening: Diminished (comment)     Cardiovascular  Regular rate and rhythm,  S1 and S2 normal,  no murmur, click, rub, or gallop             Dental    Dentition: Poor dentition     Pulmonary  Breath sounds clear to auscultation               Abdominal  GI exam deferred       Other Findings            Anesthetic Plan    ASA: 2  Anesthesia type: general          Induction: Intravenous  Anesthetic plan and risks discussed with: Patient and Family      Glidescope in room.

## 2017-08-08 NOTE — PROGRESS NOTES
's pre-op visit and prayer with patient as requested.     Rimma Mcpherson MDiv, BS  Board Certified

## 2017-08-08 NOTE — PERIOP NOTES
Dr. Taylor Foster called and notified that pt has had sluggish oxygen sats on room air but complains of pain. Orders to give 1000 mg IV ofirmev now. [address] Laura Schwartz at bedside, per MD, 23 hours observation, prefers 2nd floor but if necessary can go elsewhere.

## 2017-08-08 NOTE — IP AVS SNAPSHOT
[address] 
[phone] Patient: Carley Cruz MRN: [id number] HBU:[birth year] You are allergic to the following No active allergies Recent Documentation Height Weight BMI OB Status Smoking Status 1.6 m 80.6 kg 31.48 kg/m2 Postmenopausal Never Smoker Emergency Contacts Name Discharge Info Relation Home Work [employer] [facility] DISCHARGE CAREGIVER [3] Son [22] [phone] About your hospitalization You were admitted on:  August 8, 2017 You last received care in the:  [facility] 2 SURGICAL You were discharged on:  August 9, 2017 Unit phone number:  [phone] Why you were hospitalized Your primary diagnosis was: Incarcerated Hernia Providers Seen During Your Hospitalizations Provider Role Specialty Primary office phone Juwan Ly MD Attending Provider General Surgery [phone] Your Primary Care Physician (PCP) Primary Care Physician Office Phone Office Fax Cesilia Crawford [phone] Follow-up Information Follow up With Details Comments Contact Info Juwan Ly MD Follow up on 8/11/2017 at 12:00 pm [address] [address] 
[phone] Mike Anton MD   [address] 
[phone] Your Appointments Friday August 11, 2017 12:00 PM EDT Global Post Op with Juwan yL MD  
[facility] ([facility] MAIN) [address] [place] [address]  
[phone] Current Discharge Medication List  
  
START taking these medications Dose & Instructions Dispensing Information Comments Morning Noon Evening Bedtime  
 docusate sodium 100 mg capsule Commonly known as:  Anson Rodriguez Your next dose is: This evening  Dose:  100 mg  
 Take 1 Cap by mouth two (2) times a day for 30 days. Quantity:  60 Cap Refills:  0 HYDROcodone-acetaminophen 5-325 mg per tablet Commonly known as:  Mei Bunch Your next dose is: Take on as needed schedule 1-2 tabs by mouth every 4 hours prn pain Quantity:  40 Tab Refills:  0  
     
   
   
   
  
 ondansetron 4 mg disintegrating tablet Commonly known as:  ZOFRAN ODT Your next dose is: Take on as needed schedule Dose:  4 mg Take 1 Tab by mouth every six (6) hours as needed for Nausea (and vomiting). Quantity:  30 Tab Refills:  0 CONTINUE these medications which have CHANGED Dose & Instructions Dispensing Information Comments Morning Noon Evening Bedtime  
 glipiZIDE 10 mg tablet Commonly known as:  Carolina Rick What changed:  when to take this Your next dose is:  Tomorrow Morning Dose:  10 mg Take 1 Tab by mouth daily. Quantity:  90 Tab Refills:  3  
     
  
   
   
   
  
 lisinopril 10 mg tablet Commonly known as:  Velramirez Ross What changed:   
- when to take this 
- additional instructions Your next dose is:  Tomorrow Morning Dose:  10 mg Take 1 Tab by mouth daily. Quantity:  90 Tab Refills:  3  
     
  
   
   
   
  
 losartan 50 mg tablet Commonly known as:  COZAAR What changed:  additional instructions Your next dose is:  Tomorrow Morning Dose:  50 mg Take 1 Tab by mouth daily. Quantity:  30 Tab Refills:  11  
     
  
   
   
   
  
 metFORMIN 500 mg tablet Commonly known as:  GLUCOPHAGE What changed:   
- how much to take 
- how to take this - when to take this 
- additional instructions Your next dose is:  Tomorrow Morning TAKE TWO TABLETS BY MOUTH TWICE DAILY Quantity:  360 Tab Refills:  3 CONTINUE these medications which have NOT CHANGED Dose & Instructions Dispensing Information Comments Morning Noon Evening Bedtime  
 gabapentin 300 mg capsule Commonly known as:  NEURONTIN Your next dose is: Take on as needed schedule Dose:  300 mg Take 1 Cap by mouth nightly as needed. Quantity:  90 Cap Refills:  3  
     
   
   
   
  
 traZODone 50 mg tablet Commonly known as:  Grady Rosas Your next dose is: This evening Dose:  50 mg Take 1 Tab by mouth nightly. Quantity:  30 Tab Refills:  11 STOP taking these medications   
 naproxen 500 mg tablet Commonly known as:  NAPROSYN  
   
  
 TYLENOL 325 mg tablet Generic drug:  acetaminophen Where to Get Your Medications Information on where to get these meds will be given to you by the nurse or doctor. ! Ask your nurse or doctor about these medications  
  docusate sodium 100 mg capsule HYDROcodone-acetaminophen 5-325 mg per tablet  
 ondansetron 4 mg disintegrating tablet Discharge Instructions Leave dressing intact until seen in follow up. Sponge bathe only while drain in place Drain care and record output daily (leave drain dressing intact until follow up) empty drain when full Abdominal binder at all times Diet: sips of liquids today and advance to liquid diet in AM and advance as tolerated to soft diet over next few days as function becomes more normal.  Watch blood sugars No heavy lifting (>10lbs) for 6 weeks to reduce risk of disrupting hernia repair. May resume normal activity including walking, which is encouraged to reduce risk of blood clots. No driving until you are completely off pain meds for 24hrs and have no pain with movements associated with driving. Pain and nausea prescriptions on chart for patient to use as needed Stool softener prescription to take to help avoid straining at stool DISCHARGE SUMMARY from Nurse The following personal items are in your possession at time of discharge: 
 
Dental Appliances: None Visual Aid: None Home Medications: None Jewelry: None Clothing: Shirt, Pants, Undergarments, Footwear Other Valuables: None PATIENT INSTRUCTIONS: 
 
After general anesthesia or intravenous sedation, for 24 hours or while taking prescription Narcotics: · Limit your activities · Do not drive and operate hazardous machinery · Do not make important personal or business decisions · Do  not drink alcoholic beverages · If you have not urinated within 8 hours after discharge, please contact your surgeon on call. Report the following to your surgeon: 
· Excessive pain, swelling, redness or odor of or around the surgical area · Temperature over 100.5 · Nausea and vomiting lasting longer than 4 hours or if unable to take medications · Any signs of decreased circulation or nerve impairment to extremity: change in color, persistent  numbness, tingling, coldness or increase pain · Any questions What to do at Home: 
Recommended activity: Activity as tolerated, see MD instructions If you experience any of the following symptoms fever greater than 101, new or unrelieved pain, persistent nausea or vomiting, please follow up with MD. 
 
 
*  Please give a list of your current medications to your Primary Care Provider. *  Please update this list whenever your medications are discontinued, doses are 
    changed, or new medications (including over-the-counter products) are added. *  Please carry medication information at all times in case of emergency situations. These are general instructions for a healthy lifestyle: No smoking/ No tobacco products/ Avoid exposure to second hand smoke Surgeon General's Warning:  Quitting smoking now greatly reduces serious risk to your health. Obesity, smoking, and sedentary lifestyle greatly increases your risk for illness A healthy diet, regular physical exercise & weight monitoring are important for maintaining a healthy lifestyle You may be retaining fluid if you have a history of heart failure or if you experience any of the following symptoms:  Weight gain of 3 pounds or more overnight or 5 pounds in a week, increased swelling in our hands or feet or shortness of breath while lying flat in bed. Please call your doctor as soon as you notice any of these symptoms; do not wait until your next office visit. Recognize signs and symptoms of STROKE: 
 
F-face looks uneven A-arms unable to move or move unevenly S-speech slurred or non-existent T-time-call 911 as soon as signs and symptoms begin-DO NOT go Back to bed or wait to see if you get better-TIME IS BRAIN. Warning Signs of HEART ATTACK Call 911 if you have these symptoms: 
? Chest discomfort. Most heart attacks involve discomfort in the center of the chest that lasts more than a few minutes, or that goes away and comes back. It can feel like uncomfortable pressure, squeezing, fullness, or pain. ? Discomfort in other areas of the upper body. Symptoms can include pain or discomfort in one or both arms, the back, neck, jaw, or stomach. ? Shortness of breath with or without chest discomfort. ? Other signs may include breaking out in a cold sweat, nausea, or lightheadedness. Don't wait more than five minutes to call [address]! Fast action can save your life. Calling 911 is almost always the fastest way to get lifesaving treatment. Emergency Medical Services staff can begin treatment when they arrive  up to an hour sooner than if someone gets to the hospital by car. The discharge information has been reviewed with the patient. The patient verbalized understanding. Discharge medications reviewed with the patient and appropriate educational materials and side effects teaching were provided. Abdominal Hernia Repair: What to Expect at Home Your Recovery After surgery to repair your hernia, you are likely to have pain for a few days. You may also feel like you have the flu, and you may have a low fever and feel tired and nauseated. This is common. You should feel better after a few days and will probably feel much better in 7 days. For several weeks you may feel twinges or pulling in the hernia repair when you move. You may have some bruising around the area of your hernia repair. This is normal. 
This care sheet gives you a general idea about how long it will take for you to recover. But each person recovers at a different pace. Follow the steps below to get better as quickly as possible. How can you care for yourself at home? Activity · Rest when you feel tired. Getting enough sleep will help you recover. · Try to walk each day. Start by walking a little more than you did the day before. Bit by bit, increase the amount you walk. Walking boosts blood flow and helps prevent pneumonia and constipation. · If your doctor gives you an abdominal binder to wear, use it as directed. This is an elastic bandage that wraps around your belly and upper hips. It helps support your belly muscles after surgery. · Avoid strenuous activities, such as biking, jogging, weight lifting, or aerobic exercise, until your doctor says it is okay. · Avoid lifting anything that would make you strain. This may include heavy grocery bags and milk containers, a heavy briefcase or backpack, cat litter or dog food bags, a vacuum , or a child. · Ask your doctor when you can drive again. · Most people are able to return to work within 1 to 2 weeks after surgery. But if your job requires that you to do heavy lifting or strenuous activity, you may need to take 4 to 6 weeks off from work. · You may shower 24 to 48 hours after surgery, if your doctor okays it. Pat the cut (incision) dry. Do not take a bath for the first 2 weeks, or until your doctor tells you it is okay. · Ask your doctor when it is okay for you to have sex. Diet · You can eat your normal diet. If your stomach is upset, try bland, low-fat foods like plain rice, broiled chicken, toast, and yogurt. · Drink plenty of fluids (unless your doctor tells you not to). · You may notice that your bowel movements are not regular right after your surgery. This is common. Avoid constipation and straining with bowel movements. You may want to take a fiber supplement every day. If you have not had a bowel movement after a couple of days, ask your doctor about taking a mild laxative. Medicines · Your doctor will tell you if and when you can restart your medicines. He or she will also give you instructions about taking any new medicines. · If you take blood thinners, such as warfarin (Coumadin), clopidogrel (Plavix), or aspirin, be sure to talk to your doctor. He or she will tell you if and when to start taking those medicines again. Make sure that you understand exactly what your doctor wants you to do. · Be safe with medicines. Take pain medicines exactly as directed. ¨ If the doctor gave you a prescription medicine for pain, take it as prescribed. ¨ If you are not taking a prescription pain medicine, ask your doctor if you can take an over-the-counter medicine. · If your doctor prescribed antibiotics, take them as directed. Do not stop taking them just because you feel better. You need to take the full course of antibiotics. · If you think your pain medicine is making you sick to your stomach: 
¨ Take your medicine after meals (unless your doctor has told you not to). ¨ Ask your doctor for a different pain medicine. Incision care · If you have strips of tape on the cut (incision) the doctor made, leave the tape on for a week or until it falls off.  Or follow your doctor's instructions for removing the tape. · If you have staples closing the cut, you will need to visit your doctor in 1 to 2 weeks to have them removed. · Wash the area daily with warm, soapy water, and pat it dry. Don't use hydrogen peroxide or alcohol, which can slow healing. You may cover the area with a gauze bandage if it weeps or rubs against clothing. Change the bandage every day. Other instructions · Hold a pillow over your incision when you cough or take deep breaths. This will support your belly and decrease your pain. · Do breathing exercises at home as instructed by your doctor. This will help prevent pneumonia. · If you had laparoscopic surgery, you may also have pain in your left shoulder. The pain usually lasts about a day or two. Follow-up care is a key part of your treatment and safety. Be sure to make and go to all appointments, and call your doctor if you are having problems. It's also a good idea to know your test results and keep a list of the medicines you take. When should you call for help? Call 911 anytime you think you may need emergency care. For example, call if: 
· You passed out (lost consciousness). · You have sudden chest pain and shortness of breath, or you cough up blood. · You have severe pain in your belly. Call your doctor now or seek immediate medical care if: 
· You are sick to your stomach and cannot keep fluids down. · You have signs of a blood clot, such as: 
¨ Pain in your calf, back of the knee, thigh, or groin. ¨ Redness and swelling in your leg or groin. · You have signs of infection, such as: 
¨ Increased pain, swelling, warmth, or redness. ¨ Red streaks leading from the incision. ¨ Pus draining from the incision. ¨ A fever. · You have trouble passing urine or stool, especially if you have mild pain or swelling in your lower belly. · Bright red blood has soaked through the bandage over your incision. Watch closely for changes in your health, and be sure to contact your doctor if: 
· Your swelling is getting worse. · Your swelling is not going down. · You still don't have a bowel movement after taking a laxative. Where can you learn more? Go to http://cuate-nick.info/. Enter B577 in the search box to learn more about \"Abdominal Hernia Repair: What to Expect at Home. \" Current as of: August 9, 2016 Content Version: 11.3 © 4766-3052 [employer]. Care instructions adapted under license by [employer] (which disclaims liability or warranty for this information). If you have questions about a medical condition or this instruction, always ask your healthcare professional. [place] [address] any warranty or liability for your use of this information. Discharge Orders None ACO Transitions of Care Introducing [employer] [address] Alexandra Santiago offers a voluntary care coordination program to provide high quality service and care to [facility] fee-for-service beneficiaries. Bjorn Sales was designed to help you enhance your health and well-being through the following services: ? Transitions of Care  support for individuals who are transitioning from one care setting to another (example: Hospital to home). ? Chronic and Complex Care Coordination  support for individuals and caregivers of those with serious or chronic illnesses or with more than one chronic (ongoing) condition and those who take a number of different medications. If you meet specific medical criteria, a [facility] [address] may call you directly to coordinate your care with your primary care physician and your other care providers. For questions about the [facility] programs, please, contact your physicians office.  
 
For general questions or additional information about Accountable Care Organizations: 
Please visit www.medicare.gov/acos. html or call 1-800-MEDICARE (2-245.716.3771) TTY users should call 7-251.985.4319. 1[employer] Announcement We are excited to announce that we are making your provider's discharge notes available to you in 1DocWay. You will see these notes when they are completed and signed by the physician that discharged you from your recent hospital stay. If you have any questions or concerns about any information you see in 1DocWay, please call the Health Information Department where you were seen or reach out to your Primary Care Provider for more information about your plan of care. Introducing [facility] HE[facility]! Po Polanco introduces 1DocWay patient portal. Now you can access parts of your medical record, email your doctor's office, and request medication refills online. 1. In your internet browser, go to [email or web address] 2. Click on the First Time User? Click Here link in the Sign In box. You will see the New Member Sign Up page. 3. Enter your 1DocWay Access Code exactly as it appears below. You will not need to use this code after youve completed the sign-up process. If you do not sign up before the expiration date, you must request a new code. · 1DocWay Access Code: [email or web address] Expires: 8/12/2017  6:52 PM 
 
4. Enter the last four digits of your Social Security Number (xxxx) and Date of Birth (mm/dd/yyyy) as indicated and click Submit. You will be taken to the next sign-up page. 5. Create a FAST FELTt ID. This will be your 1DocWay login ID and cannot be changed, so think of one that is secure and easy to remember. 6. Create a 1[employer]Way password. You can change your password at any time. 7. Enter your Password Reset Question and Answer. This can be used at a later time if you forget your password. 8. Enter your e-mail address. You will receive e-mail notification when new information is available in [address] 9. Click Sign Up. You can now view and download portions of your medical record. 10. Click the Download Summary menu link to download a portable copy of your medical information. If you have questions, please visit the Frequently Asked Questions section of the [employer] website. Remember, [employer] is NOT to be used for urgent needs. For medical emergencies, dial 911. Now available from your iPhone and Android! General Information Please provide this summary of care documentation to your next provider. Patient Signature:  ____________________________________________________________ Date:  ____________________________________________________________  
  
Larri Hazard Provider Signature:  ____________________________________________________________ Date:  ____________________________________________________________

## 2017-08-08 NOTE — PERIOP NOTES
Report received from Oscar Epstein, [address] for cont. Care of pt while awaiting room placement. Pt awake alert no c/o pain Vss abd binder in place intact.  Family at bedside

## 2017-08-08 NOTE — H&P
[facility]  [facility]. [address] [place] [address]  Edith Springer, [address] [facility]  [phone]    H&P Note   Bradley Conway   MRN: [id number]     : 1950        HPI: Bradley Conway is a 78 y.o. female who returns second colonoscopy 2017 to follow resection of a high risk adenoma. The lesion had no signs of persistence. A second tubular adenoma was removed. We should be able to wait 3 years until next colonoscopy. We can definitely wait at least 1 year and will proceed with hernia repair at this time. She continues to have pain with her hernia and desires repair. She did well after her first colonoscopy on 2017. She tolerated the procedure well but did have abdominal pain for the next 2 days. She was able to return to work on that Saturday without difficulty. She is turned into a even more complex patient. I had recommended colonoscopy, since she had never had one, prior to embarking on a complex hernia repair. She unfortunately had a greater than 10 mm sessile polyp in the descending colon. This required a saline lift technique and a barbed snare for resection. 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 No other lesions are identified. Other smaller flat lesions could be missed. This high risk adenoma with complex resection will require early surveillance. I would normally re-scope these individuals in 6 months. Given her need for hernia surgery, we will move up that surveillance to 2 months. The surveillance would be to ensure complete removal of the lesion. Once complete excision is confirmed she would have an interval colonoscopy performed in 3 years. If we are not able to completely remove this lesion, she may require partial colectomy. Of course her lesion is close to the splenic flexure, which is the most difficult part of the colon to remove.     She had CT scan evaluation of her abdominal wall which confirms her physical exam.  She has a 8.26 cm periumbilical defect that contains incarcerated fat and no bowel. She has a wide rectus diastasis and severe denervation of the R side of her abdominal wall with very little abdominal wall muscle until far lateral.  Select images are below. She also presents to discuss colonoscopy. She is past due and should be cleared prior to any hernia repair. She has normal daily BMs and denies seeing and blood or mucous per rectum. She has no FH for colon cancer or polyps. She was referred by Dr. Kristy Abdul for ventral hernia. The patient is a very pleasant woman who had gallbladder surgery ~5 years ago. It was not performed at [facility]. She had attempt at laparoscopic but was converted to an open large R subcostal incision. She noticed a bulge ~4 years ago. She denies obstructive symptoms but she has been experiencing pain with lifting. 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 She would like a repair but would like to delay until the Spring flower season has calmed down. She is also referred for colonoscopy. Of note her   of stage IV colon cancer that was diagnosed in his 62s. Her son has already had an initial colonoscopy.     Past Medical History:   Diagnosis Date    Diabetes ([facility] [address]     Hypertension     controlled with med-denies SOB with 1 flight step    Tubulovillous adenoma of colon 2017    Type 2 diabetes mellitus with diabetic polyneuropathy, without long-term current use of insulin ([facility] [address] 2017    type 2; does not check glucose- last A1C= 8.9 on 2/15/17    Type II or unspecified type diabetes mellitus without mention of complication, not stated as uncontrolled 2015    [id number] symptoms-60s     Past Surgical History:   Procedure Laterality Date    COLONOSCOPY N/A 2017    COLONOSCOPY  BMI 29 performed by Laurel Talavera MD at [address] [place] [address] N/A 2017    COLONOSCOPY/ 30 performed by Laurel Talavera MD at [facility] ENDOSCOPY    HX CHOLECYSTECTOMY      HX DILATION AND CURETTAGE       No current facility-administered medications for this encounter. Current Outpatient Prescriptions   Medication Sig    losartan (COZAAR) 50 mg tablet Take 1 Tab by mouth daily. (Patient taking differently: Take 50 mg by mouth daily. Not currently taking)    naproxen (NAPROSYN) 500 mg tablet One po bid prn pain    traZODone (DESYREL) 50 mg tablet Take 1 Tab by mouth nightly.  acetaminophen (TYLENOL) 325 mg tablet Take 650 mg by mouth every four (4) hours as needed for Pain.  metFORMIN (GLUCOPHAGE) 500 mg tablet TAKE TWO TABLETS BY MOUTH TWICE DAILY (Patient taking differently: Take 1,000 mg by mouth two (2) times daily (with meals). TAKE TWO TABLETS BY MOUTH TWICE DAILY)    gabapentin (NEURONTIN) 300 mg capsule Take 1 Cap by mouth nightly as needed.  lisinopril (PRINIVIL, ZESTRIL) 10 mg tablet Take 1 Tab by mouth daily. (Patient taking differently: Take 10 mg by mouth every morning. Stopped 2 mnths ago)    glipiZIDE (GLUCOTROL) 10 mg tablet Take 1 Tab by mouth daily. (Patient taking differently: Take 10 mg by mouth every morning.)     ALLERGIES:  Review of patient's allergies indicates no known allergies.     Social History     Social History    Marital status:      Spouse name: N/A    Number of children: N/A    Years of education: N/A     Social History Main Topics    Smoking status: Never Smoker    Smokeless tobacco: Never Used    Alcohol use No    Drug use: No    Sexual activity: Not Currently     Other Topics Concern    Not on file     Social History Narrative     History   Smoking Status    Never Smoker   Smokeless Tobacco    Never Used     Family History   Problem Relation Age of Onset    Diabetes Mother    Daria Rojas Cancer Father     Stroke Sister     Cancer Sister     Cancer Sister     Diabetes Sister     Diabetes Sister     Heart Disease Sister      no MI- stent    Lung Disease Brother     Diabetes Brother        ROS: The patient has no difficulty with chest pain or shortness of breath. No fever or chills. The patient denies any personal or family history of abnormal clotting or bleeding. Comprehensive review of systems was otherwise unremarkable except as noted above. Physical Exam:   Constitutional: Alert oriented cooperative patient in no acute distress. Visit Vitals    /78    Pulse 84    Ht 5' 3\" (1.6 m)    Wt 179 lb (81.2 kg)    SpO2 98%    BMI 31.71 kg/m2   Eyes:Sclera are clear without icterus. ENMT: no obvious neck masses, no ear or lip lesions  CV: RRR. Normal perfusion  Resp: No JVD. Breathing is  non-labored. GI: obese, soft and non-distended, obvious bulge to L of umbilicus. Wide rectus diastasis, 2-3 cm defect associated with periumbilical incision scar with nonreducible bulging to left that does not feel to contain obvious bowel. Large and wide R subcostal incision with classic denervation pattern with atrophy and non contraction of R rectus abdominus inferior to the scar. The right side bulges with abdominal wall contraction. Musculoskeletal: unremarkable with normal function. Neuro:  No obvious focal deficits  Psychiatric: normal affect and mood, no memory impairment    Lab Results   Component Value Date/Time    HGB 13.1 08/01/2017 10:30 AM       Lab Results   Component Value Date/Time    Sodium 142 02/15/2017 10:14 AM    Potassium 4.6 02/15/2017 10:14 AM    Chloride 100 02/15/2017 10:14 AM    CO2 24 02/15/2017 10:14 AM    BUN 13 02/15/2017 10:14 AM    Creatinine 0.67 02/15/2017 10:14 AM    Glucose 176 02/15/2017 10:14 AM    Bilirubin, total 0.4 02/15/2017 10:14 AM    AST (SGOT) 17 02/15/2017 10:14 AM    Alk.  phosphatase 91 02/15/2017 10:14 AM     I reviewed her CT images    CT ABDOMEN AND PELVIS WITH CONTRAST: 3/14/2017  HISTORY: evaluate incisional abdominal wall hernias and rectus diastasis;  evaluate incisional abdominal wall hernias and rectus diastasis, eval incisional  abd wall hernia lt abd. Pain worsening over past month. gb removed, no hx cancer  COMPARISON: None available  TECHNIQUE: Oral contrast was administered. 125 cc of nonionic intravenous  contrast was injected, and axial helical CT images were obtained from above the  diaphragm through the pelvis. Coronal reformatted images were obtained at the  scanner console and made available for review. Radiation dose reduction  techniques were used for this study: Our CT scanners use one or all of the  following: Automated exposure control, adjustment of the mA and/or kVp according  to patient's size, iterative reconstruction. FINDINGS:  ABDOMEN:  Minimal dependent subsegmental atelectasis bilateral lung bases. Evidence of  cholecystectomy. There is a small fluid collection in the gallbladder fossa  measuring approximately 2.1 x 3.0 cm, most likely representing a small  postoperative seroma. Normal-appearing liver, spleen, pancreas, bilateral  adrenal glands and kidneys. Mild calcific atherosclerosis of a normal caliber  abdominal aorta. No evidence of significant lymphadenopathy. Small duodenal  diverticulum. Normal-appearing small bowel. No evidence of intraperitoneal  free air or free fluid. There is a moderate-sized fat-containing periumbilical  hernia, with a narrow neck measuring approximately 2.8 cm in diameter. PELVIS:  Normal-appearing urinary bladder. Normal-appearing uterus and bilateral adnexa. Normal-appearing colon and appendix. No evidence of pelvic free fluid. No  evidence of significant inguinal or pelvic sidewall lymphadenopathy. Visualized osseous structures unremarkable. IMPRESSION:   1. There is a moderate-sized fat-containing periumbilical hernia.   2. Other incidental findings as above.              Assessment/Plan:     Mariah Gibson is a 78 y.o. female who has signs and symptoms consistent with a periumbilical ventral incisional hernia from a trocar site that is complicated by R sided abdominal wall denervation and wide diastasis of the rectus muscles. She is also obese and has diabetes. The fascial defect is of moderate size but carries risk of bowel incarceration and obstruction. Unfortunately she is high risk for failure of the repair as well. She will require a mesh repair which carries a greater complication rate but has the greatest percentage to be the only durable repair. She will require an oversized mesh due to her poor surrounding tissue. 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 CT scan confirms her physical findings and is not at immediate risk for bowel incarceration. We attempted to clear her colon prior to any hernia surgery, but encountered a high risk sessile tubulovillous adenoma that required complex excision. This required early surveillance and follow up colonoscopy showed a complete excision and an additional tubular adenoma. We agreed that we can proceed with hernia repair. We discussed proceeding incarcerated ventral incisional hernia repair with mesh. I discussed the patient's condition and treatment options with the patient. I discussed risks of hernia repair in language the patient could understand including bleeding, infection, recurrence, chronic pain, medication reaction, need for further surgery, abscess, fistula, SBO, DVT, PE, heart attack, stroke, renal failure, respiratory failure, ventilatory dependence, and death. The patient voiced understanding of all this and all questions were answered. Alternatives to hernia repair were discussed also and risks of the alternatives. The patient requested that we proceed with hernia repair. Informed consent was obtained.      Problem List  Date Reviewed: 7/14/2017          Codes Class Noted    Tubulovillous adenoma of colon ICD-10-CM: D12.6  ICD-9-CM: 211.3  4/28/2017    Overview Signed 4/28/2017  3:47 PM by Criselda Duckworth MD     Initial colonoscopy with >10 mm sessile polypectomy from descending colon 4/20/2017  Diagnosis:   LEFT COLON POLYP: FRAGMENTS OF MIXED TUBULOVILLOUS ADENOMA. Electronically signed out on 4/21/2017 10:07 by BELLE Burr M.D.               Incisional hernia with obstruction but no gangrene ICD-10-CM: K43.0  ICD-9-CM: 552.21  2/27/2017        Diastasis of rectus abdominis ICD-10-CM: M62.08  ICD-9-CM: 728.84  2/27/2017        Obesity, Class I, BMI 30.0-34.9 (see actual BMI) ICD-10-CM: E66.9  ICD-9-CM: 278.00  2/27/2017        Type 2 diabetes mellitus with diabetic polyneuropathy, without long-term current use of insulin (HCC) ICD-10-CM: E11.42  ICD-9-CM: 250.60, 357.2  2/15/2017        [facility] II diabetes mellitus ([facility] ICD-10-CM: E11.9  ICD-9-CM: 250.00  8/5/2015        Essential hypertension, benign ICD-10-CM: I10  ICD-9-CM: 401.1  8/5/2015                Criselda Duckworth MD,  FACS

## 2017-08-08 NOTE — BRIEF OP NOTE
BRIEF OPERATIVE NOTE    Date of Procedure: 8/8/2017   Preoperative Diagnosis: Incarcerated Ventral incisional hernia [K43.2]  Postoperative Diagnosis: Incarcerated Ventral incisional hernia [K43.2]    Procedure(s):  INCARCERATED VENTRAL INCISIONAL HERNIA REPAIR; IMPLANT MESH  Surgeon(s) and Role:     * Fleta Goodell, MD - Primary         Assistant Staff:       Surgical Staff:  Circ-1: Crescencio Sequeira  Scrub Tech-1: Lianna Forte  Scrub Tech-2: Navyay Dany  Event Time In   Incision Start 6129   Incision Close 1141     Anesthesia: General   Estimated Blood Loss: <50 cc  Specimens: * No specimens in log *   Findings: very attenuated fascia with defect at supraumbilical trocar site that had greatly widened containing incarcerated omentum. Omentum viable and only able to be reduced after adhesiolysis at opening of hernia into small umbilical defect. Nonviable hernia sac excised. Clean fascial edges but attenuated fascia and rectus diastasis. ~5 cm defect. Closed vertically with interrupted 0-PDS sutures and threaded through 4\" x 6\" (10.2 c 15.2 cm) Bard Phasix slow absorbable mesh and tied over for onlay repair. Edges of mesh attached to anterior fascia with running 0-PDS suture. No mesh wasted. After irrigation and confirmation of hemostasis, Onel powder layer covered mesh and #15 round Brent drain placed over mesh. Deep closure with 2-0 Stratafix suture, then deep dermal layer of interrupted 3-0 vicryl suture then skin closed with 4-0 vicryl running subcuticular suture and Dermabond. Dressed with AquacellAg combo dressing. Abdominal binder placed. Complications: none apparent  Implants:   Implant Name Type Inv.  Item Serial No.  Lot No. LRB No. Used Action   [employer] [id number] -- [facility] - [id number]   [employer] [id number] -- [facility] [id number] N/A 1 Implanted     Fleta Goodell, MD

## 2017-08-08 NOTE — ANESTHESIA POSTPROCEDURE EVALUATION
Post-Anesthesia Evaluation and Assessment    Patient: Carley Cruz MRN: [id number]  SSN: [id number]    YOB: 1950  Age: 78 y.o. Sex: female       Cardiovascular Function/Vital Signs  Visit Vitals    /66    Pulse 62    Temp 36.4 °C (97.6 °F)    Resp 16    Ht 5' 3\" (1.6 m)    Wt 80.6 kg (177 lb 11.2 oz)    SpO2 96%    BMI 31.48 kg/m2       Patient is status post general anesthesia for Procedure(s): HERNIA VENTRAL REPAIR. Nausea/Vomiting: None    Postoperative hydration reviewed and adequate. Pain:  Pain Scale 1: Numeric (0 - 10) (08/08/17 1356)  Pain Intensity 1: 3 (08/08/17 1356)   Managed    Neurological Status:   Neuro (WDL): Exceptions to WDL (08/08/17 1315)  Neuro  Neurologic State: Drowsy (08/08/17 1315)  Orientation Level: Oriented X4 (08/08/17 1315)  Cognition: Follows commands (08/08/17 1315)  Speech: Clear (08/08/17 1315)  LUE Motor Response: Purposeful (08/08/17 1315)  LLE Motor Response: Purposeful (08/08/17 1315)  RUE Motor Response: Purposeful (08/08/17 1315)  RLE Motor Response: Purposeful (08/08/17 1315)   At baseline    Mental Status and Level of Consciousness: Awake. Pulmonary Status:   O2 Device: Nasal cannula (08/08/17 1359)   Adequate oxygenation and airway patent    Complications related to anesthesia: None    Post-anesthesia assessment completed.  No concerns    Signed By: Serina Branch MD     August 8, 2017

## 2017-08-09 VITALS
BODY MASS INDEX: 31.48 KG/M2 | WEIGHT: 177.7 LBS | RESPIRATION RATE: 18 BRPM | OXYGEN SATURATION: 94 % | TEMPERATURE: 97.8 F | HEIGHT: 63 IN | HEART RATE: 75 BPM | SYSTOLIC BLOOD PRESSURE: 134 MMHG | DIASTOLIC BLOOD PRESSURE: 58 MMHG

## 2017-08-09 LAB
GLUCOSE BLD STRIP.AUTO-MCNC: 127 MG/DL (ref 65–100)
GLUCOSE BLD STRIP.AUTO-MCNC: 155 MG/DL (ref 65–100)

## 2017-08-09 PROCEDURE — 82962 GLUCOSE BLOOD TEST: CPT

## 2017-08-09 PROCEDURE — 74011250637 HC RX REV CODE- 250/637: Performed by: SURGERY

## 2017-08-09 PROCEDURE — 99218 HC RM OBSERVATION: CPT

## 2017-08-09 PROCEDURE — 74011250636 HC RX REV CODE- 250/636: Performed by: SURGERY

## 2017-08-09 PROCEDURE — 74011000250 HC RX REV CODE- 250: Performed by: SURGERY

## 2017-08-09 PROCEDURE — 74011636637 HC RX REV CODE- 636/637: Performed by: SURGERY

## 2017-08-09 RX ADMIN — HYDROCODONE BITARTRATE AND ACETAMINOPHEN 1 TABLET: 5; 325 TABLET ORAL at 02:18

## 2017-08-09 RX ADMIN — SODIUM CHLORIDE, SODIUM LACTATE, POTASSIUM CHLORIDE, AND CALCIUM CHLORIDE 75 ML/HR: 600; 310; 30; 20 INJECTION, SOLUTION INTRAVENOUS at 05:46

## 2017-08-09 RX ADMIN — ENOXAPARIN SODIUM 40 MG: 40 INJECTION SUBCUTANEOUS at 07:43

## 2017-08-09 RX ADMIN — HYDROCODONE BITARTRATE AND ACETAMINOPHEN 1 TABLET: 5; 325 TABLET ORAL at 05:53

## 2017-08-09 RX ADMIN — HYDROCODONE BITARTRATE AND ACETAMINOPHEN 1 TABLET: 5; 325 TABLET ORAL at 13:55

## 2017-08-09 RX ADMIN — DOCUSATE SODIUM 100 MG: 100 CAPSULE, LIQUID FILLED ORAL at 07:43

## 2017-08-09 RX ADMIN — FAMOTIDINE 20 MG: 10 INJECTION, SOLUTION INTRAVENOUS at 07:43

## 2017-08-09 RX ADMIN — INSULIN HUMAN 2 UNITS: 100 INJECTION, SOLUTION PARENTERAL at 13:52

## 2017-08-09 RX ADMIN — HYDROCODONE BITARTRATE AND ACETAMINOPHEN 1 TABLET: 5; 325 TABLET ORAL at 10:03

## 2017-08-09 NOTE — ROUTINE PROCESS
Called & spoke with Dr. Lea Dawkins receiving call for Dr. Tiffany Hernandez. Notified of only recorded urinary output at 1204 this afternoon & patient states she has not voided since that time. Has been assisted to toilet & only able to urinate a few drops of clear yellow urine. Bladder scan showing 77 cc. New order to insert indwelling ramsey catheter & notify him if output less than 250 cc.

## 2017-08-09 NOTE — ROUTINE PROCESS
Cespedes catheter inserted 1st attempt & tolerated well. 350 cc clear urine return. Will continue to monitor.

## 2017-08-09 NOTE — DISCHARGE INSTRUCTIONS
Leave dressing intact until seen in follow up. Sponge bathe only while drain in place  Drain care and record output daily (leave drain dressing intact until follow up) empty drain when full  Abdominal binder at all times  Diet: sips of liquids today and advance to liquid diet in AM and advance as tolerated to soft diet over next few days as function becomes more normal.  Watch blood sugars  No heavy lifting (>10lbs) for 6 weeks to reduce risk of disrupting hernia repair. May resume normal activity including walking, which is encouraged to reduce risk of blood clots. No driving until you are completely off pain meds for 24hrs and have no pain with movements associated with driving. Pain and nausea prescriptions on chart for patient to use as needed  Stool softener prescription to take to help avoid straining at stool       DISCHARGE SUMMARY from Nurse    The following personal items are in your possession at time of discharge:    Dental Appliances: None  Visual Aid: None     Home Medications: None  Jewelry: None  Clothing: Shirt, Pants, Undergarments, Footwear  Other Valuables: None             PATIENT INSTRUCTIONS:    After general anesthesia or intravenous sedation, for 24 hours or while taking prescription Narcotics:  · Limit your activities  · Do not drive and operate hazardous machinery  · Do not make important personal or business decisions  · Do  not drink alcoholic beverages  · If you have not urinated within 8 hours after discharge, please contact your surgeon on call.     Report the following to your surgeon:  · Excessive pain, swelling, redness or odor of or around the surgical area  · Temperature over 100.5  · Nausea and vomiting lasting longer than 4 hours or if unable to take medications  · Any signs of decreased circulation or nerve impairment to extremity: change in color, persistent  numbness, tingling, coldness or increase pain  · Any questions        What to do at Home:  Recommended activity: Activity as tolerated, see MD instructions    If you experience any of the following symptoms fever greater than 101, new or unrelieved pain, persistent nausea or vomiting, please follow up with MD.      *  Please give a list of your current medications to your Primary Care Provider. *  Please update this list whenever your medications are discontinued, doses are      changed, or new medications (including over-the-counter products) are added. *  Please carry medication information at all times in case of emergency situations. These are general instructions for a healthy lifestyle:    No smoking/ No tobacco products/ Avoid exposure to second hand smoke    Surgeon General's Warning:  Quitting smoking now greatly reduces serious risk to your health. Obesity, smoking, and sedentary lifestyle greatly increases your risk for illness    A healthy diet, regular physical exercise & weight monitoring are important for maintaining a healthy lifestyle    You may be retaining fluid if you have a history of heart failure or if you experience any of the following symptoms:  Weight gain of 3 pounds or more overnight or 5 pounds in a week, increased swelling in our hands or feet or shortness of breath while lying flat in bed. Please call your doctor as soon as you notice any of these symptoms; do not wait until your next office visit. Recognize signs and symptoms of STROKE:    F-face looks uneven    A-arms unable to move or move unevenly    S-speech slurred or non-existent    T-time-call 911 as soon as signs and symptoms begin-DO NOT go       Back to bed or wait to see if you get better-TIME IS BRAIN. Warning Signs of HEART ATTACK     Call 911 if you have these symptoms:   Chest discomfort. Most heart attacks involve discomfort in the center of the chest that lasts more than a few minutes, or that goes away and comes back.  It can feel like uncomfortable pressure, squeezing, fullness, or pain.   Discomfort in other areas of the upper body. Symptoms can include pain or discomfort in one or both arms, the back, neck, jaw, or stomach.  Shortness of breath with or without chest discomfort.  Other signs may include breaking out in a cold sweat, nausea, or lightheadedness. Don't wait more than five minutes to call 911 - MINUTES MATTER! Fast action can save your life. Calling 911 is almost always the fastest way to get lifesaving treatment. Emergency Medical Services staff can begin treatment when they arrive -- up to an hour sooner than if someone gets to the hospital by car. The discharge information has been reviewed with the patient. The patient verbalized understanding. Discharge medications reviewed with the patient and appropriate educational materials and side effects teaching were provided. Abdominal Hernia Repair: What to Expect at Home  Your Recovery  After surgery to repair your hernia, you are likely to have pain for a few days. You may also feel like you have the flu, and you may have a low fever and feel tired and nauseated. This is common. You should feel better after a few days and will probably feel much better in 7 days. For several weeks you may feel twinges or pulling in the hernia repair when you move. You may have some bruising around the area of your hernia repair. This is normal.  This care sheet gives you a general idea about how long it will take for you to recover. But each person recovers at a different pace. Follow the steps below to get better as quickly as possible. How can you care for yourself at home? Activity  · Rest when you feel tired. Getting enough sleep will help you recover. · Try to walk each day. Start by walking a little more than you did the day before. Bit by bit, increase the amount you walk. Walking boosts blood flow and helps prevent pneumonia and constipation.   · If your doctor gives you an abdominal binder to wear, use it as directed. This is an elastic bandage that wraps around your belly and upper hips. It helps support your belly muscles after surgery. · Avoid strenuous activities, such as biking, jogging, weight lifting, or aerobic exercise, until your doctor says it is okay. · Avoid lifting anything that would make you strain. This may include heavy grocery bags and milk containers, a heavy briefcase or backpack, cat litter or dog food bags, a vacuum , or a child. · Ask your doctor when you can drive again. · Most people are able to return to work within 1 to 2 weeks after surgery. But if your job requires that you to do heavy lifting or strenuous activity, you may need to take 4 to 6 weeks off from work. · You may shower 24 to 48 hours after surgery, if your doctor okays it. Pat the cut (incision) dry. Do not take a bath for the first 2 weeks, or until your doctor tells you it is okay. · Ask your doctor when it is okay for you to have sex. Diet  · You can eat your normal diet. If your stomach is upset, try bland, low-fat foods like plain rice, broiled chicken, toast, and yogurt. · Drink plenty of fluids (unless your doctor tells you not to). · You may notice that your bowel movements are not regular right after your surgery. This is common. Avoid constipation and straining with bowel movements. You may want to take a fiber supplement every day. If you have not had a bowel movement after a couple of days, ask your doctor about taking a mild laxative. Medicines  · Your doctor will tell you if and when you can restart your medicines. He or she will also give you instructions about taking any new medicines. · If you take blood thinners, such as warfarin (Coumadin), clopidogrel (Plavix), or aspirin, be sure to talk to your doctor. He or she will tell you if and when to start taking those medicines again. Make sure that you understand exactly what your doctor wants you to do. · Be safe with medicines.  Take pain medicines exactly as directed. ¨ If the doctor gave you a prescription medicine for pain, take it as prescribed. ¨ If you are not taking a prescription pain medicine, ask your doctor if you can take an over-the-counter medicine. · If your doctor prescribed antibiotics, take them as directed. Do not stop taking them just because you feel better. You need to take the full course of antibiotics. · If you think your pain medicine is making you sick to your stomach:  ¨ Take your medicine after meals (unless your doctor has told you not to). ¨ Ask your doctor for a different pain medicine. Incision care  · If you have strips of tape on the cut (incision) the doctor made, leave the tape on for a week or until it falls off. Or follow your doctor's instructions for removing the tape. · If you have staples closing the cut, you will need to visit your doctor in 1 to 2 weeks to have them removed. · Wash the area daily with warm, soapy water, and pat it dry. Don't use hydrogen peroxide or alcohol, which can slow healing. You may cover the area with a gauze bandage if it weeps or rubs against clothing. Change the bandage every day. Other instructions  · Hold a pillow over your incision when you cough or take deep breaths. This will support your belly and decrease your pain. · Do breathing exercises at home as instructed by your doctor. This will help prevent pneumonia. · If you had laparoscopic surgery, you may also have pain in your left shoulder. The pain usually lasts about a day or two. Follow-up care is a key part of your treatment and safety. Be sure to make and go to all appointments, and call your doctor if you are having problems. It's also a good idea to know your test results and keep a list of the medicines you take. When should you call for help? Call 911 anytime you think you may need emergency care. For example, call if:  · You passed out (lost consciousness).   · You have sudden chest pain and shortness of breath, or you cough up blood. · You have severe pain in your belly. Call your doctor now or seek immediate medical care if:  · You are sick to your stomach and cannot keep fluids down. · You have signs of a blood clot, such as:  ¨ Pain in your calf, back of the knee, thigh, or groin. ¨ Redness and swelling in your leg or groin. · You have signs of infection, such as:  ¨ Increased pain, swelling, warmth, or redness. ¨ Red streaks leading from the incision. ¨ Pus draining from the incision. ¨ A fever. · You have trouble passing urine or stool, especially if you have mild pain or swelling in your lower belly. · Bright red blood has soaked through the bandage over your incision. Watch closely for changes in your health, and be sure to contact your doctor if:  · Your swelling is getting worse. · Your swelling is not going down. · You still don't have a bowel movement after taking a laxative. Where can you learn more? Go to http://cuate-nick.info/. Enter B577 in the search box to learn more about \"Abdominal Hernia Repair: What to Expect at Home. \"  Current as of: August 9, 2016  Content Version: 11.3  © 0726-1348 [employer], [employer]. Care instructions adapted under license by [employer] (which disclaims liability or warranty for this information). If you have questions about a medical condition or this instruction, always ask your healthcare professional. [place] [address] any warranty or liability for your use of this information.

## 2017-08-09 NOTE — PERIOP NOTES
TRANSFER - OUT REPORT:    Verbal report given to Marli Mcleod RN on Ginnamanjula Nunes  being transferred to [facility](unit) for routine post - op       Report consisted of patients Situation, Background, Assessment and   Recommendations(SBAR). Information from the following report(s) SBAR, OR Summary, Procedure Summary, Intake/Output, MAR and Recent Results was reviewed with the receiving nurse. Lines:   Peripheral IV 08/08/17 Right Hand (Active)   Site Assessment Clean, dry, & intact 8/8/2017  1:15 PM   Phlebitis Assessment 0 8/8/2017  1:15 PM   Infiltration Assessment 0 8/8/2017  1:15 PM   Dressing Status Clean, dry, & intact 8/8/2017  1:15 PM   Dressing Type Tape;Transparent 8/8/2017  1:15 PM   Hub Color/Line Status Infusing 8/8/2017  1:15 PM        Opportunity for questions and clarification was provided. Patient transported with:   O2 @ 2 liters    VTE prophylaxis orders have been written for [facility]. Patient and family given floor number and nurses name. Family updated re: pt status after security code verified.

## 2017-08-09 NOTE — PROGRESS NOTES
Discharge instructions and prescriptions provided and explained to patient, patient voiced understanding. Medication side effect sheet reviewed with pt. No home meds or valuables to return. Opportunity for questions provided. RN to go over drain care. Instructed to call once ready to leave the floor.

## 2017-08-09 NOTE — PROGRESS NOTES
PLAN:  D/c ramsey -- must be able to void prior to d/c  Cont CLD -- awaiting bowel function  Pain control PRN  Wean O2 as tolerated  OOB/Ambulate in halls  VTE prophy -- SCDs, Lovenox    Hopefully home later today if able to void      ASSESSMENT:  Admit Date: 8/8/2017   1 Day Post-Op  Procedure(s): HERNIA VENTRAL REPAIR    Principal Problem:    Incarcerated hernia (8/8/2017)         SUBJECTIVE:  Resting in bed. Feels well but reports abd is \"sore. \" Required repeat ramsey insertion last night -- patent with clear urine. No N/V, some flatus. AF, VSS      OBJECTIVE:  Constitutional: Alert oriented cooperative patient in no acute distress; appears stated age   Visit Vitals    /57    Pulse 79    Temp 98 °F (36.7 °C)    Resp 18    Ht 5' 3\" (1.6 m)    Wt 80.6 kg (177 lb 11.2 oz)    SpO2 90%    BMI 31.48 kg/m2     Eyes: Sclera are clear. EOMs intact  ENMT: No external lesions, gross hearing normal; no obvious neck masses, no ear or lip lesions  CV: RRR, S1S2. Normal perfusion, pulses palpable, no edema  Resp: No JVD. Breathing is non-labored; no audible wheezing. BBS clear, on O2    GI: Obese, soft and non-distended, appropriately tender, dressings c/d/i, abd binder in place  Musculoskeletal: Unremarkable with normal function. No embolic signs or cyanosis.    Neuro: Oriented; moves all 4; no focal deficits  Psychiatric: Normal affect and mood, no memory impairment      Patient Vitals for the past 24 hrs:   BP Temp Pulse Resp SpO2   08/09/17 0719 126/57 98 °F (36.7 °C) 79 18 90 %   08/09/17 0300 126/71 98 °F (36.7 °C) 77 16 92 %   08/08/17 2300 124/70 97.9 °F (36.6 °C) 63 17 99 %   08/08/17 2045 151/68 97.9 °F (36.6 °C) 64 17 95 %   08/08/17 1916 173/77 - 63 16 96 %   08/08/17 1716 165/70 - (!) 56 16 99 %   08/08/17 1615 177/77 - 61 16 96 %   08/08/17 1516 157/70 - 65 14 98 %   08/08/17 1510 160/71 - 62 14 98 %   08/08/17 1506 171/68 - 63 14 99 %   08/08/17 1500 174/79 - 65 14 98 %   08/08/17 1455 156/79 - 64 14 98 %   08/08/17 1450 146/74 - 68 14 99 %   08/08/17 1445 173/74 - 62 14 98 %   08/08/17 1441 168/72 - 68 14 98 %   08/08/17 1435 149/64 - (!) 58 14 98 %   08/08/17 1430 161/70 - (!) 57 14 98 %   08/08/17 1425 149/68 - 66 14 98 %   08/08/17 1420 151/65 - 63 14 97 %   08/08/17 1415 150/64 - (!) 57 14 97 %   08/08/17 1411 149/63 - (!) 57 14 97 %   08/08/17 1405 149/60 - 64 14 96 %   08/08/17 1400 164/70 - 61 14 97 %   08/08/17 1359 - - - - 96 %   08/08/17 1355 141/65 - 86 14 97 %   08/08/17 1350 150/64 - 60 14 93 %   08/08/17 1346 140/63 - 77 14 (!) 89 %   08/08/17 1341 160/65 - 64 14 94 %   08/08/17 1335 145/60 - 65 14 97 %   08/08/17 1330 138/63 - 66 14 98 %   08/08/17 1326 144/63 - 64 14 95 %   08/08/17 1320 166/67 - 63 14 95 %   08/08/17 1316 157/63 - 66 14 96 %   08/08/17 1315 - 97.6 °F (36.4 °C) - - 98 %   08/08/17 1310 151/66 - 62 16 97 %   08/08/17 1305 157/67 - 73 14 97 %   08/08/17 1300 154/65 - 63 14 97 %   08/08/17 1255 160/65 - 78 14 91 %   08/08/17 1250 151/78 - 90 16 98 %   08/08/17 1245 152/66 - 62 14 96 %   08/08/17 1240 154/68 - 68 14 97 %   08/08/17 1235 181/73 - 66 14 96 %   08/08/17 1230 153/70 - 65 15 96 %   08/08/17 1225 166/72 - 69 16 95 %   08/08/17 1220 169/72 - 72 16 90 %   08/08/17 1215 165/72 - 75 16 95 %   08/08/17 1210 174/77 99.4 °F (37.4 °C) 88 17 93 %       Labs:  No results for input(s): WBC, HGB, PLT, NA, K, CL, CO2, BUN, CREA, GLU, PTP, INR, APTT, TBIL, TBILI, CBIL, SGOT, GPT, ALT, AP, AML, LPSE, LCAD, NH4, TROPT, TROIQ, HGBEXT, PLTEXT in the last 72 hours. No lab exists for component: INREXT      WALKER Garland    I have seen and examined the patient with the Nurse Practitioner and agree with the above assessment and plan. Cisco Ward.  Tyra Keita MD

## 2017-08-09 NOTE — DISCHARGE SUMMARY
[facility] [address]  [place], [address] [facility]  [phone]     Discharge Summary     Beverlyn Gitelman  MRN: [id number]     : 1950     Age: 78 y.o. Admit date: 2017     Discharge date: 17   Attending Physician: Dr. Bren Marino MD, FACS  Primary Discharge Diagnosis:   Principal Problem:    Incarcerated hernia (2017)      Primary Operations or Procedures Performed :  Procedure(s): HERNIA VENTRAL REPAIR     Brief History and Reason for Admission: Beverlyn Gitelman was admitted with the following history of present illness. Beverlyn Gitelman is a 78 y.o. female who returns second colonoscopy 2017 to follow resection of a high risk adenoma. The lesion had no signs of persistence. A second tubular adenoma was removed. We should be able to wait 3 years until next colonoscopy. We can definitely wait at least 1 year and will proceed with hernia repair at this time. She continues to have pain with her hernia and desires repair.     She did well after her first colonoscopy on 2017. She tolerated the procedure well but did have abdominal pain for the next 2 days. She was able to return to work on that Saturday without difficulty. She is turned into a even more complex patient. I had recommended colonoscopy, since she had never had one, prior to embarking on a complex hernia repair. She unfortunately had a greater than 10 mm sessile polyp in the descending colon. This required a saline lift technique and a barbed snare for resection. 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 No other lesions are identified. Other smaller flat lesions could be missed. This high risk adenoma with complex resection will require early surveillance. I would normally re-scope these individuals in 6 months. Given her need for hernia surgery, we will move up that surveillance to 2 months.   The surveillance would be to ensure complete removal of the lesion. Once complete excision is confirmed she would have an interval colonoscopy performed in 3 years. If we are not able to completely remove this lesion, she may require partial colectomy. Of course her lesion is close to the splenic flexure, which is the most difficult part of the colon to remove.     She had CT scan evaluation of her abdominal wall which confirms her physical exam.  She has a 6.85 cm periumbilical defect that contains incarcerated fat and no bowel. She has a wide rectus diastasis and severe denervation of the R side of her abdominal wall with very little abdominal wall muscle until far lateral.  Select images are below.     She also presents to discuss colonoscopy. She is past due and should be cleared prior to any hernia repair. She has normal daily BMs and denies seeing and blood or mucous per rectum. She has no FH for colon cancer or polyps.     She was referred by Dr. Brad Greene for ventral hernia. The patient is a very pleasant woman who had gallbladder surgery ~5 years ago. It was not performed at [address] [place] [address]. She had attempt at laparoscopic but was converted to an open large R subcostal incision. She noticed a bulge ~4 years ago. She denies obstructive symptoms but she has been experiencing pain with lifting. 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 She would like a repair but would like to delay until the Spring flower season has calmed down. She is also referred for colonoscopy.     Of note her   of stage IV colon cancer that was diagnosed in his 62s. Her son has already had an initial colonoscopy. Hospital Course:  She was taken to the OR on 17 by Dr. González Harley for repair of incarcerated ventral hernia with mesh implant. She was hypoxic in PACU and required O2 via NC. She was felt to need admission for overnight observation. Her ramsey was replaced last night due to having not voided. Today she feels well. Cespedes was removed and she has voided x2 with PVR of 0. Pain is controlled and she is tolerating liquids. She is felt stable for discharge home today. Condition at Discharge: good    Discharge Medications:   Current Discharge Medication List      START taking these medications    Details   docusate sodium (COLACE) 100 mg capsule Take 1 Cap by mouth two (2) times a day for 30 days. Qty: 60 Cap, Refills: 0      HYDROcodone-acetaminophen (NORCO) 5-325 mg per tablet 1-2 tabs by mouth every 4 hours prn pain  Qty: 40 Tab, Refills: 0      ondansetron (ZOFRAN ODT) 4 mg disintegrating tablet Take 1 Tab by mouth every six (6) hours as needed for Nausea (and vomiting). Qty: 30 Tab, Refills: 0         CONTINUE these medications which have NOT CHANGED    Details   traZODone (DESYREL) 50 mg tablet Take 1 Tab by mouth nightly. Qty: 30 Tab, Refills: 11    Associated Diagnoses: Primary insomnia      metFORMIN (GLUCOPHAGE) 500 mg tablet TAKE TWO TABLETS BY MOUTH TWICE DAILY  Qty: 360 Tab, Refills: 3    Associated Diagnoses: Type 2 diabetes mellitus with diabetic polyneuropathy, without long-term current use of insulin (HCC)      gabapentin (NEURONTIN) 300 mg capsule Take 1 Cap by mouth nightly as needed. Qty: 90 Cap, Refills: 3    Associated Diagnoses: Neuropathy (HCC)      lisinopril (PRINIVIL, ZESTRIL) 10 mg tablet Take 1 Tab by mouth daily. Qty: 90 Tab, Refills: 3    Associated Diagnoses: Uncontrolled type 2 diabetes mellitus without complication, without long-term current use of insulin ([address] Essential hypertension, benign      glipiZIDE (GLUCOTROL) 10 mg tablet Take 1 Tab by mouth daily. Qty: 90 Tab, Refills: 3    Associated Diagnoses: Type II or unspecified type diabetes mellitus without mention of complication, not stated as uncontrolled      losartan (COZAAR) 50 mg tablet Take 1 Tab by mouth daily.   Qty: 30 Tab, Refills: 11    Associated Diagnoses: Essential hypertension, benign         STOP taking these medications       naproxen (NAPROSYN) 500 mg tablet Comments:   Reason for Stopping:         acetaminophen (TYLENOL) 325 mg tablet Comments:   Reason for Stopping:                 Disposition/Discharge Instructions/Follow-up Care:      Leave dressing intact until seen in follow up. Sponge bathe only while drain in place  Drain care and record output daily (leave drain dressing intact until follow up)  Abdominal binder at all times  Diet: sips of liquids today and advance to liquid diet in AM and advance as tolerated to soft diet over next few days as function becomes more normal.  Watch blood sugars  No heavy lifting (>10lbs) for 6 weeks to reduce risk of disrupting hernia repair. May resume normal activity including walking, which is encouraged to reduce risk of blood clots. No driving until you are completely off pain meds for 24hrs and have no pain with movements associated with driving. Pain and nausea prescriptions on chart for patient to use as needed  Stool softener prescription to take to help avoid straining at stool       Signed:  WALKER Way   8/9/2017  1:00 PM    I have seen and examined the patient with the Nurse Practitioner and agree with the above assessment and plan. Kaya Haile.  Tiffany Hernandez MD

## 2017-08-09 NOTE — ROUTINE PROCESS
END OF SHIFT NOTE:    INTAKE/OUTPUT  08/08 0701 - 08/09 0700  In: 2151 [P.O.:80; I.V.:2071]  Out: 2081 [Urine:1600; Drains:50]  Voiding: YES  Catheter: YES  Drain:   Tez-Abbott Drain 08/08/17 Mid Abdomen (Active)   Site Assessment Clean, dry, & intact 8/9/2017 12:07 AM   Dressing Status Clean, dry, & intact 8/9/2017 12:07 AM   Status Patent; Charged;Draining;Suction (specify 8/9/2017 12:07 AM   Drainage Color Sanguinous 8/9/2017 12:07 AM   Output (ml) 25 ml 8/9/2017  3:00 AM               Flatus: Patient does have flatus present. Stool:  0 occurrences. Characteristics:       Emesis: 0 occurrences. Characteristics:        VITAL SIGNS  Patient Vitals for the past 12 hrs:   Temp Pulse Resp BP SpO2   08/09/17 0300 98 °F (36.7 °C) 77 16 126/71 92 %   08/08/17 2300 97.9 °F (36.6 °C) 63 17 124/70 99 %   08/08/17 2045 97.9 °F (36.6 °C) 64 17 151/68 95 %   08/08/17 1916 - 63 16 173/77 96 %       Pain Assessment  Pain Intensity 1: 5 (08/09/17 0553)  Pain Location 1: Abdomen  Pain Intervention(s) 1: Medication (see MAR)  Patient Stated Pain Goal: 5    Ambulating  Yes    Shift report given to oncoming nurse at the bedside.     Josefina Livingston LPN

## 2017-08-11 NOTE — OP NOTES
[address], [address] [facility]  [phone]    OPERATVE REPORT    Name: Mariah Gibson     Date of Surgery: 8/8/2017  Med Record Number: [id number]   Age: 78 y.o. Sex: female   Preoperative Diagnosis: Incarcerated Ventral incisional hernia [K43.2]  Postoperative Diagnosis: Incarcerated Ventral incisional hernia [K43.2]    Procedure(s):  INCARCERATED VENTRAL INCISIONAL HERNIA REPAIR; IMPLANT MESH  Surgeon(s) and Role:     * Rik Mayo MD - Primary      Assistant Staff:         Surgical Staff:  Circ-1: Severiano Heron  Scrub Tech-1: Cate Cummings  Scrub Tech-2: Ibeth Arias  Event Time In   Incision Start 4978   Incision Close 1141      Anesthesia: General   Estimated Blood Loss: <50 cc  Specimens: * No specimens in log *   Findings: very attenuated fascia with defect at supraumbilical trocar site that had greatly widened containing incarcerated omentum. Omentum viable and only able to be reduced after adhesiolysis at opening of hernia into small umbilical defect. Nonviable hernia sac excised. Clean fascial edges but attenuated fascia and rectus diastasis. ~5 cm defect. Closed vertically with interrupted 0-PDS sutures and threaded through 4\" x 6\" (10.2 c 15.2 cm) Bard Phasix slow absorbable mesh and tied over for onlay repair. Edges of mesh attached to anterior fascia with running 0-PDS suture. No mesh wasted. After irrigation and confirmation of hemostasis, Onel powder layer covered mesh and #15 round Brent drain placed over mesh. Deep closure with 2-0 Stratafix suture, then deep dermal layer of interrupted 3-0 vicryl suture then skin closed with 4-0 vicryl running subcuticular suture and Dermabond. Dressed with AquacellAg combo dressing. Abdominal binder placed. Complications: none apparent  Implants:   Implant Name Type Inv.  Item Serial No.  Lot No. LRB No. Used Action   MESH RECON RECT [id number] -- PHASIX - [id number] [place] 73 RECT [id number] -- PHASIX    BARD Ilsa Calzada [id number] N/A 1 Implanted        Procedure Description:   The risks, benefits, potential complications, treatment options, and expected outcomes were discussed with the patient pre-operatively. The patient voiced understanding and gave informed consent preoperatively. The patient was taken to the Operating Room, and the [facility] time-out protocol checklist was followed. After the induction of adequate anesthesia, the abdomen was prepped and draped in the usual sterile fashion. The hernia site was marked and an Ioban drape was used to limit any skin contact. Preoperative antibiotics were given. The incarcerated hernia bulge was to the left of the midline in the periumbilical region. This was associated with a supraumbilical trocar site. A small upper midline incision was made that continued down to the left arm of her supraumbilical trocar incision. Underlying tissues were periodically irrigated with Ancef irrigation. The large hernia sac was  from the surrounding tissues using blunt, sharp, and electrosurgical dissection. The fasciocutaneous flap was somewhat thin over this bulge but in general all skin areas were viable. Attempts were made to reduce the hernia but this was not possible without entering the hernia sac. The hernia sac did contain viable greater omentum. This was freed from the hernia sac without difficulty. Still cannot be reduced. There was a small umbilical defect as well and the hernia defect was made into one defect by dividing the small 5 mm bridge between the 2 hernias. This allowed the omentum to be reduced. There was good hemostasis on the omentum. The bowel was not manipulated. The hernia sac was then resected and discarded. The defect was a 5 cm Jamul. The patient had very attenuated fascia and atrophic muscle on both sides of the abdomen.   She had a prior subcostal incision and had significant denervation of the right rectus. She had significant diastases rectus. She is obese and repair would be under significant tension. The decision was made to perform a buttressed repair with onlay mesh. This was performed by first closing the defect in a vertical fashion with interrupted 0 PDS sutures. The tags were left long and a 4\" x 6\" piece of Phasix for absorbing mesh was placed as an onlay. The fascia was cleared creating a plane between the abdominal wall fat and the anterior fascia so that the mesh could be deployed fully. The suture ends were threaded through the mesh and tied over the mesh. Again Ancef irrigation was used. With the mesh fully deployed the onlay was tacked to the anterior fascia using running 0 PDS sutures to circumferentially spread out the mesh. The mesh was in excellent position. A #15 round Brent drain was then partially trimmed and placed over the mesh with exit site in the left upper quadrant using the trocar. The drain was sewn in place with 2-0 silk suture. Drain bulb was attached. The entire defect was then covered with Onel hemostatic powder. The defect was then closed in 3 layers. The deep subcutaneous fascial layer was closed with running 2-0 Stratfix barbed suture. A deep dermal layer was closed with interrupted 3-0 Vicryl sutures. The skin incision was then closed with a subcuticular 4-0 Vicryl suture. Dermabond was placed. Once dried the incision was covered with a Aquacel Ag/DuoDERM combo dressing. The drain site was dressed with gauze and Tegaderm. The patient was then transferred to her PACU stretcher and an abdominal binder was placed. All sponge, instruments, and needle counts were correct. The patient was taken to recovery in good condition.     Dez Olson MD, FACS

## 2018-08-31 ENCOUNTER — PATIENT OUTREACH (OUTPATIENT)
Dept: CASE MANAGEMENT | Age: 68
End: 2018-08-31

## 2018-09-04 ENCOUNTER — PATIENT OUTREACH (OUTPATIENT)
Dept: CASE MANAGEMENT | Age: 68
End: 2018-09-04

## 2018-09-04 NOTE — PROGRESS NOTES
Medication Adherence Outreach    Date/Time of Call:  9/4/2018  11:00 am    Name of medication and dosage:   * Glipizide 10 mg daily    Does the patient know the purpose and dosage of medication? * Yes    Are you getting a #30 day or #90 day supply of your medication? * 90 day supply    Are you still taking this medication? If not, why? * Yes    Transportation issues or any problems paying for the medication or other reason? * No    What pharmacy are you using to fill your medication and do you know the last time that you got your medication filled? * [facility]   * Last refill 6/26/2018      Are you having any side effects from taking your medication? * No          Any other questions or concerns expressed by the patient. * No    Comments:  * Discussed medication adherence with Ms. Russell and the importance of being adherent  with medication daily. Ms. Dominik Reynolds states she has no current barriers to prevent her from obtaining or taking her medication.           Call Completed By:  [id number] GLENIS Salas.

## 2018-10-26 ENCOUNTER — HOSPITAL ENCOUNTER (OUTPATIENT)
Dept: MAMMOGRAPHY | Age: 68
Discharge: HOME | End: 2018-10-26
Attending: FAMILY MEDICINE
Payer: MEDICARE

## 2018-10-26 DIAGNOSIS — Z12.39 BREAST CANCER SCREENING, HIGH RISK PATIENT: ICD-10-CM

## 2018-10-26 PROCEDURE — 77067 SCR MAMMO BI INCL CAD: CPT

## 2018-11-20 ENCOUNTER — HOSPITAL ENCOUNTER (OUTPATIENT)
Dept: MAMMOGRAPHY | Age: 68
Discharge: HOME | End: 2018-11-20
Attending: FAMILY MEDICINE
Payer: MEDICARE

## 2018-11-20 DIAGNOSIS — R92.8 ABNORMAL SCREENING MAMMOGRAM: ICD-10-CM

## 2018-11-20 DIAGNOSIS — Z80.3 FH: BREAST CANCER IN FIRST DEGREE RELATIVE WHEN <50 YEARS OLD: ICD-10-CM

## 2018-11-20 PROCEDURE — 77065 DX MAMMO INCL CAD UNI: CPT

## 2018-11-20 PROCEDURE — 76642 ULTRASOUND BREAST LIMITED: CPT

## 2018-12-10 ENCOUNTER — PATIENT OUTREACH (OUTPATIENT)
Dept: CASE MANAGEMENT | Age: 68
End: 2018-12-10

## 2018-12-11 ENCOUNTER — PATIENT OUTREACH (OUTPATIENT)
Dept: CASE MANAGEMENT | Age: 68
End: 2018-12-11

## 2019-02-19 PROBLEM — K46.0 INCARCERATED HERNIA: Status: RESOLVED | Noted: 2017-08-08 | Resolved: 2019-02-19

## 2019-02-21 ENCOUNTER — HOSPITAL ENCOUNTER (OUTPATIENT)
Dept: MAMMOGRAPHY | Age: 69
Discharge: HOME | End: 2019-02-21
Attending: FAMILY MEDICINE
Payer: MEDICARE

## 2019-02-21 DIAGNOSIS — M89.9 BONE DISORDER: ICD-10-CM

## 2019-02-21 PROCEDURE — 77080 DXA BONE DENSITY AXIAL: CPT

## 2019-02-22 NOTE — PROGRESS NOTES
Let patient know her bone density test is low showing osteopenia but not osteoporosis. She is to be sure to take 500 mg of calcium daily.

## 2019-02-22 NOTE — PROGRESS NOTES
Pt informed. Insurance coming Tuesday to get pt on another plan to hopefully get her medication. She was told to ask for samples, pt informed Dr Karla Siemens does not give samples.

## 2019-05-22 ENCOUNTER — TELEPHONE (OUTPATIENT)
Dept: DIABETES SERVICES | Age: 69
End: 2019-05-22

## 2019-05-23 ENCOUNTER — HOSPITAL ENCOUNTER (OUTPATIENT)
Dept: DIABETES SERVICES | Age: 69
Discharge: HOME | End: 2019-05-23
Attending: PHYSICIAN ASSISTANT
Payer: MEDICARE

## 2019-05-23 PROCEDURE — G0108 DIAB MANAGE TRN  PER INDIV: HCPCS

## 2019-06-12 ENCOUNTER — TELEPHONE (OUTPATIENT)
Dept: DIABETES SERVICES | Age: 69
End: 2019-06-12

## 2019-06-12 NOTE — TELEPHONE ENCOUNTER
Pt called to cancel all diabetes education stating work is too demanding at this time. Pt only came for diabetes assessment. Will close chart. Thank you.

## 2020-03-16 ENCOUNTER — HOSPITAL ENCOUNTER (OUTPATIENT)
Dept: MAMMOGRAPHY | Age: 70
Discharge: HOME | End: 2020-03-16
Attending: FAMILY MEDICINE

## 2020-03-16 DIAGNOSIS — Z12.39 SCREENING FOR MALIGNANT NEOPLASM OF BREAST: ICD-10-CM

## 2020-08-24 PROBLEM — E11.69 MIXED DIABETIC HYPERLIPIDEMIA ASSOCIATED WITH TYPE 2 DIABETES MELLITUS (HCC): Status: ACTIVE | Noted: 2020-08-24

## 2020-08-24 PROBLEM — E78.2 MIXED DIABETIC HYPERLIPIDEMIA ASSOCIATED WITH TYPE 2 DIABETES MELLITUS (HCC): Status: ACTIVE | Noted: 2020-08-24

## 2020-11-08 ENCOUNTER — TELEPHONE (OUTPATIENT)
Dept: PHARMACY | Age: 70
End: 2020-11-08

## 2020-11-08 NOTE — TELEPHONE ENCOUNTER
CLINICAL PHARMACY: STATIN REVIEW    SUBJECTIVE:   Identified as DM care gap for [employer]: statin therapy. OBJECTIVE:  Allergies   Allergen Reactions    Lisinopril Cough       Medications per current medication list:  Current Outpatient Medications   Medication Sig Dispense Refill    atorvastatin (LIPITOR) 20 mg tablet Take 1 Tab by mouth daily. 90 Tab 3    olmesartan (BENICAR) 40 mg tablet Take 1 Tab by mouth daily. 30 Tab 11    Trulicity 1.5 OR/7.2 mL sub-q pen 0.5 mL by SubCUTAneous route every seven (7) days. 12 Syringe 3    metFORMIN ER (GLUCOPHAGE XR) 500 mg tablet Take 1 Tab by mouth daily (with dinner). 90 Tab 3    meloxicam (MOBIC) 15 mg tablet Take 1 Tab by mouth daily. For pain and inflammation after eating 90 Tab 3    gabapentin (NEURONTIN) 300 mg capsule 2 tabs po tid prn 180 Cap 11    losartan (COZAAR) 50 mg tablet Take 1 Tab by mouth daily. 90 Tab 3    cholecalciferol, vitamin D3, (VITAMIN D3 PO) Take 400 Units by mouth.  multivitamin (ONE A DAY) tablet Take 1 Tab by mouth daily.  ACCU-CHEK FASTCLIX LANCET DRUM misc Check glucose 3 times daily, E11.65 300 Each 3    ACCU-CHEK FASTCLIX LANCING DEV kit Check glucose 3 times daily, E11.65 1 Kit 1    ACCU-CHEK GUIDE strip Check BGL 3 times daily 300 Strip 3    acetaminophen (TYLENOL) 325 mg tablet Take  by mouth every four (4) hours as needed for Pain.          Labs:  Lab Results   Component Value Date/Time    Cholesterol, total 187 08/06/2020 09:50 AM    HDL Cholesterol 55 08/06/2020 09:50 AM    LDL, calculated 99 08/06/2020 09:50 AM    VLDL, calculated 33 08/06/2020 09:50 AM    Triglyceride 167 (H) 08/06/2020 09:50 AM     ALT (SGPT)   Date Value Ref Range Status   08/06/2020 12 0 - 32 IU/L Final     AST (SGOT)   Date Value Ref Range Status   08/06/2020 13 0 - 40 IU/L Final       ASCVD risk:  WHITE OR     BP Readings from Last 1 Encounters:   08/04/20 158/74       Social History     Tobacco Use    Smoking status: Never Smoker    Smokeless tobacco: Never Used   Substance Use Topics    Alcohol use: No         ASSESSMENT:    ADA Guidelines:     >/= 36years old:   o No history of ASCVD or 10-year ASCVD risk < 20% - moderate-intensity statin is recommended.   o History of ASCVD or 10-year ASCVD risk > 20% - high-intensity statin is recommended. Per chart review, patient is prescribed Atorvastatin 20 mg- 1 tab daily  - atorvastatin d/c 2/19/20 with re: not a current med  - then prescribed again 8/2020     Will route to clinical pharmacy  to outreach to pharmacy to confirm most recent refill data as well as prescription, prescriber and insurance information.      PLAN:  Please check on atorvastatin adherence/billing     Thank you,    Niurka Victoria, PharmD, [address] [facility] Pharmacist  Direct: [phone], Ext 7

## 2020-11-10 NOTE — TELEPHONE ENCOUNTER
CLINICAL PHARMACY: STATIN THERAPY REVIEW    Identified care gap per [employer] statin use in persons with diabetes and adherence     Per [facility]    Medication: Atorvastatin 20mg  Recent  fill dates: 8/24/2020  Day supply: 90  Refills remaining: 3  Directions: 1 tab po qd  Insurance billed: Jigna  Prescribing Provider: Federica Julio PA-C      Attempting to reach patient. Left message asking for return call to toll free [phone] option 7. Called patient to inquire about picking up Atorvastatin. Per pharmacy medication was fill but never picked up. Requested that pharmacy reprocess fill in hopes of contacting patient to have her  and start taking. Will continue outreach.          Dl Hernandez, [address] [address], [facility]  Department, toll free: [phone], option 7

## 2020-11-11 RX ORDER — PRAVASTATIN SODIUM 40 MG/1
40 TABLET ORAL
Qty: 90 TAB | Refills: 3 | Status: SHIPPED | OUTPATIENT
Start: 2020-11-11 | End: 2021-06-10

## 2020-11-11 NOTE — TELEPHONE ENCOUNTER
Apolinar Catherine MD  - Patient states did not tolerate atorvastatin (does not make her feel good)  - Patient willing to try a different statin  - would recommended pravastatin 40 mg or rosuvastatin 10 mg (both of which have lower SE profile)  - Please send script to patient pharmacy if you agree    Thank you!   Daren Samuels, PharmD, [address] [facility] Pharmacist  Direct: [phone], Ext [phone]

## 2020-11-11 NOTE — TELEPHONE ENCOUNTER
Patient called back to discuss VM left. Stated that she felt \"off\" when taking statin and did not want to take it anymore due to that. Advised that perhaps taking it at night may help as it helped other patients in the past, but she just did not feel comfortable taking it. Suggested that perhaps MD could change her to a different statin that she may tolerate better and she was ok with that. Please reach out to MD to discuss statin alternatives for patient.

## 2020-11-13 NOTE — TELEPHONE ENCOUNTER
Attempted to reach patient regarding new prescription sent to pharmacy, MECHELLE Miranda, PharmD, [address] [facility] Pharmacist  Direct: [phone], Ext [phone]  ==================================  CLINICAL PHARMACY CONSULT: MED RECONCILIATION/REVIEW ADDENDUM    For Pharmacy Admin Tracking Only    PHSO: PHSO Patient?: Yes  Total # of Interventions Recommended: Count: 1  - New Order #: 1 New Medication Order Reason(s): Needs Additional Medication Therapy  - Maintenance Safety Lab Monitoring #: 1 and - New Therapy Lab Monitoring #: 1  Recommended intervention potential cost savings: 1  Total Interventions Accepted: 1  Time Spent (min): [address] [facility], PharmD  [address]

## 2021-08-16 ENCOUNTER — TELEPHONE (OUTPATIENT)
Dept: PHARMACY | Age: 71
End: 2021-08-16

## 2021-08-16 NOTE — TELEPHONE ENCOUNTER
CLINICAL PHARMACY: ADHERENCE REVIEW  Identified care gap per Jigna; fills at [facility]: ACE/ARB, Diabetes and Statin adherence    Last Visit: 7/14/21    ASSESSMENT  ACE/ARB ADHERENCE    Per Insurance Records through 8/10/21 (2020 [facility] = n/a; YTD PDC = 89%; Potential Fail Date: 11/17/21): Olmesartan 40 mg tab last filled on 7/9/21 for 90 day supply. Next refill due: 10/7/21    Per Reconciled Dispense Report:   OLMESARTAN MEDOXOMIL  40 MG TABS 07/09/2021 90 90 Tablet LAILA LOBATO [facility] -. .. OLMESARTAN MEDOXOMIL  40 MG TABS 03/26/2021 90 90 Tablet LAILA LOBATO [facility] -. .. BP Readings from Last 3 Encounters:   07/14/21 130/72   06/30/21 (!) 172/80   06/23/21 (!) 168/80     Estimated Creatinine Clearance: 66.6 mL/min (by C-G formula based on SCr of 0.76 mg/dL). DIABETES ADHERENCE    Per Insurance Records through 8/10/21 (2020 [facility] = n/a; YTD PDC = 100%; Potential Fail Date: 12/22/21): Metformin 500 mg tab last filled on 7/10/21 for 90 day supply. Next refill due: 10/8/21 (max refill 93/64/80)  Trulicity 1.5 mg last filled 5/3/21 x 28 day supply. Next refill due 5/31/21 (max refill due 10/13/21)    Per Reconciled Dispense Report: 1/28/21 to 8/3/21  METFORMIN HYDROCHLORIDE ER  500 MG TB24 07/10/2021 90 90 Tablet LAILA LOBATO [facility] -. .. METFORMIN HYDROCHLORIDE ER  500 MG TB24 03/26/2021 90 90 Tablet LAILA LOBATO [facility] -. .. TRULICITY  2.5 FB/9.2QR SOPN 05/03/2021 28 2 mL ANUP TORRES [facility] -. .. TRULICITY  9.3 XN/3.0NW SOPN 04/06/2021 28 2 mL ANUP TORRES [facility] -. .. TRULICITY  4.1 AK/4.0DQ SOPN 03/09/2021 28 2 mL ANUP TORRES [facility] -. .. TRULICITY  8.8 MM/0.2AT SOPN 02/08/2021 28 2 mL ANUP TORRES [facility] -. ..      Lab Results   Component Value Date/Time    Hemoglobin A1c 6.8 (H) 03/26/2021 03:42 PM    Hemoglobin A1c 6.5 (H) 08/06/2020 09:50 AM    Hemoglobin A1c 6.3 (H) 03/03/2020 11:37 AM    Hemoglobin A1c (POC) 6.5 09/25/2019 02:15 PM    Hemoglobin A1c (POC) 6.3 06/24/2019 03:00 PM     NOTE A1c <9%    STATIN ADHERENCE    Per Insurance Records through 8/10/21 (2020 [facility] = n/a; YTD PDC = filled only once, potential fail date 8/19/21): Atorvastatin 20 mg tab last filled on 3/26/21 for 90 day supply. Next refill due: 6/24/21  Per review pt was on atorvastatin 20 mg daily, changed to pravastatin 40 mg 11/11/20 due to tolerability - see 11/8/20 pharmacy telephone visit (pt felt \"off\" when taking statin and did not want to take anymore). Per 3/26/21 OV pravastatin made her ache, atorvastatin 20 mg ordered (pravastatin was removed from med list as \"list cleanup\" 6/10/21.)    Per Reconciled Dispense Report: 1/28/21 to 8/3/21  ATORVASTATIN CALCIUM  20 MG TABS 03/26/2021 90 90 Tablet LAILA LOBATO [facility] -. ..      Lab Results   Component Value Date/Time    Cholesterol, total 199 03/26/2021 03:42 PM    HDL Cholesterol 50 03/26/2021 03:42 PM    LDL, calculated 113 (H) 03/26/2021 03:42 PM    LDL, calculated 99 08/06/2020 09:50 AM    VLDL, calculated 36 03/26/2021 03:42 PM    VLDL, calculated 33 08/06/2020 09:50 AM    Triglyceride 209 (H) 03/26/2021 03:42 PM     ALT (SGPT)   Date Value Ref Range Status   03/26/2021 14 0 - 32 IU/L Final     AST (SGOT)   Date Value Ref Range Status   03/26/2021 17 0 - 40 IU/L Final     The 10-year ASCVD risk score (Jarad Chang, et al., 2013) is: 26.3%    Values used to calculate the score:      Age: 69 years      Sex: Female      Is Non- : No      Diabetic: Yes      Tobacco smoker: No      Systolic Blood Pressure: 483 mmHg      Is BP treated: Yes      HDL Cholesterol: 50 mg/dL      Total Cholesterol: 199 mg/dL     PLAN  The following are interventions that have been identified:  - Patient overdue refilling atorvastatin and active on home medication list - noted intolerance to statins (atorvastatin, pravastatin) in the past so would like to discuss tolerability  - appears Trulicity may be overdue to be refilled - have not yet called pharmacy but believe should have refills on file    Attempting to reach patient to review.  Left message asking for return call.     Future Appointments   Date Time Provider Viktor Patterson   9/1/2021  3:30 PM Ld GARRISON   9/28/2021  3:30 PM Francie Guillen MD [facility] THI Rios PharmD, [facility]  Department, toll free: [phone]

## 2021-08-16 NOTE — LETTER
[address]  [address], [address]        [place]el[place]   [facility] [address]           08/20/21     Dear Geovanna Kinsey,    We tried to reach you recently regarding some of your medications, but were unable to reach you on the telephone. We have on file that you are currently taking Trulicity 1.5 mg under the skin once weekly; atorvastatin 20 mg tablet - 1 tablet everyday. If you are no longer taking or taking differently, please call us at the number below so that we can discuss this and update your medication profile. We wanted to check in and make sure you have these medications at home and that you are not having any troubles with them. It appears that these medications may be overdue to be refilled. We are worried you might be missing doses or not taking them as directed. It is important that you take your medications regularly and try not to miss a single dose. Please contact us to discuss further or follow up with your pharmacy to refill them.     Some ways to help you remember to take and refill your medications are to:  · Use a pill box, set an alarm, and/or keep your medication near something that you do every day  · Ask your pharmacy if they participate in [facility]", a program where you can  all of your medications on the same day  · Ask your pharmacy if you can be set up with automatic refill, where they will automatically refill your prescription when it is due and let you know it's ready to     Sincerely,   Steffany Rivas PharmD, [facility]  Department, toll free: [phone]

## 2021-08-20 NOTE — TELEPHONE ENCOUNTER
Another attempt made to reach patient. No answer, LM. Will send letter.     For Pharmacy Admin Tracking Only     Time Spent (min): 10

## 2021-09-28 PROBLEM — H35.3211 EXUDATIVE AGE-RELATED MACULAR DEGENERATION OF RIGHT EYE WITH ACTIVE CHOROIDAL NEOVASCULARIZATION (HCC): Status: ACTIVE | Noted: 2021-09-28

## 2021-11-10 ENCOUNTER — TELEPHONE (OUTPATIENT)
Dept: PHARMACY | Age: 71
End: 2021-11-10

## 2021-11-10 NOTE — TELEPHONE ENCOUNTER
CLINICAL PHARMACY: ADHERENCE REVIEW  Identified care gap per [place]; fills at [address]: ACE/ARB, Diabetes and Statin adherence    Last Visit: 9/28/21 PCP; 9/1/21 endo    ASSESSMENT  ACE/ARB ADHERENCE    Per Insurance Records through 11/6/21 (2020 [facility] = n/a; YTD PDC = 80%; Potential Fail Date: 11/16/21): Olmesartan 40 mg tab last filled on 7/9/21 for 90 day supply. Next refill due: 10/7/21    Per Reconciled Dispense Report: 4/24/21 to 9/28/21  OLMESARTAN MEDOXOMIL  40 MG TABS 07/09/2021 90 90 Tablet [id number] .. Per [facility]:   Olmesartan last picked up on 7/9/21 for 90 day supply. Rx from 9/28/21 profiled - billed through [employer] ($11 for both medications)    BP Readings from Last 3 Encounters:   09/28/21 139/74   07/14/21 130/72   06/30/21 (!) 172/80     Estimated Creatinine Clearance: 68.6 mL/min (by C-G formula based on SCr of 0.73 mg/dL). DIABETES ADHERENCE    Per Insurance Records through 11/6/21 (7413 [facility] = n/a; YTD PDC = 91%; Potential Fail Date: 12/21/21): Metformin  mg tab last filled on 7/10/21 for 90 day supply. Next refill due: 85/83/71  Trulicity 1.5 mg last filled 5/3/21 for 28 ds - see media tab, appears getting through PAP    Per Reconciled Dispense Report: 4/24/21 to 9/28/21  METFORMIN HYDROCHLORIDE ER  500 MG TB24 07/10/2021 90 90 Tablet [id number] -. .. TRULICITY  9.5 NH/6.6AI SOPN 05/03/2021 28 2 mL ANUP TORRES [id number] -. .. Per [facility]:   Metformin last picked up on 7/10/21.  Rx profiled - billed through [employer] ($11 for both medications)    Lab Results   Component Value Date/Time    Hemoglobin A1c 6.9 (H) 09/28/2021 04:19 PM    Hemoglobin A1c 6.8 (H) 03/26/2021 03:42 PM    Hemoglobin A1c 6.5 (H) 08/06/2020 09:50 AM    Hemoglobin A1c (POC) 6.5 09/25/2019 02:15 PM    Hemoglobin A1c (POC) 6.3 06/24/2019 03:00 PM     NOTE A1c <9%    STATIN ADHERENC E    Per Insurance Records through 11/6/21 (2020 [facility] = n/a; YTD PDC = filled only once, impossible in 2021): Atorvastatin 20 mg tab last filled on 3/26/21 for 90 day supply. Next refill due: 6/24/21    Lab Results   Component Value Date/Time    Cholesterol, total 208 (H) 09/28/2021 04:19 PM    HDL Cholesterol 48 09/28/2021 04:19 PM    LDL, calculated 90 09/28/2021 04:19 PM    LDL, calculated 99 08/06/2020 09:50 AM    VLDL, calculated 70 (H) 09/28/2021 04:19 PM    VLDL, calculated 33 08/06/2020 09:50 AM    Triglyceride 424 (H) 09/28/2021 04:19 PM     ALT (SGPT)   Date Value Ref Range Status   09/28/2021 15 0 - 32 IU/L Final     AST (SGOT)   Date Value Ref Range Status   09/28/2021 14 0 - 40 IU/L Final     The 10-year ASCVD risk score (Dano Ackerman, et al., 2013) is: 32.6%    Values used to calculate the score:      Age: 66 years      Sex: Female      Is Non- : No      Diabetic: Yes      Tobacco smoker: No      Systolic Blood Pressure: 537 mmHg      Is BP treated: Yes      HDL Cholesterol: 48 mg/dL      Total Cholesterol: 208 mg/dL     PLAN  The following are interventions that have been identified:  - Patient overdue refilling metformin & olmesartan and active on home medication list - awaiting  at pharmacy ($11 for both medications)    Attempting to reach patient to review.  Left message asking for return call. [employer] message also sent although does not appear to actively use.     Future Appointments   Date Time Provider Viktor Patterson   3/1/2022 11:30 AM Brant CHIN BS ENDO   3/23/2022  8:45 AM FPA PM MISCELLANEOUS SSA FPA FPA   3/28/2022  2:15 PM Nupur Guillen MD SSA FPA FPA     Leatha Doshi, PharmD, [facility]  Department, toll free: [phone]

## 2021-11-10 NOTE — TELEPHONE ENCOUNTER
Patient returned call and stated that she had supply on hand from previous fills and was planning on calling the pharmacy tomorrow to get her medications refilled. She stated would have gotten it last week but she had a procedure done on her eye and had some trouble seeing.

## 2021-11-10 NOTE — TELEPHONE ENCOUNTER
Noted - thank you!     For Pharmacy [id number] in place: No   Recommendation Provided To: Patient/Caregiver: 2 via Telephone and Pharmacy: 2   Intervention Detail: Adherence Monitorin   Gap Closed?: Yes   Intervention Accepted By: Patient/Caregiver: 2 and Pharmacy: 2   Time Spent (min): 20

## 2021-11-24 NOTE — TELEPHONE ENCOUNTER
Called to Caroline - last metformin p/u July or June. Was ready for her but it was not picked up so put back. They will get ready. Copay $3.87 through [facility]. Olmesartan picked up 11/10/21. Called back to patient to discuss metformin. No answer, LM.

## 2021-12-20 ENCOUNTER — TELEPHONE (OUTPATIENT)
Dept: PHARMACY | Age: 71
End: 2021-12-20

## 2021-12-20 NOTE — TELEPHONE ENCOUNTER
CLINICAL PHARMACY: ADHERENCE REVIEW  Identified care gap per [place]; fills at [facility]: ACE/ARB, Diabetes and Statin adherence    Last Visit: 9/28/21 PCP, 9/1/21 endo    ASSESSMENT  ACE/ARB ADHERENCE    Per Insurance Records through 12/15/21 (2020 [facility] = n/a; YTD PDC = 81%; Potential Fail Date: PASSED in 2021):   Olmesartan 40 mg tab last filled on 11/10/21 for 90 day supply. Next refill due: 2/8/22    BP Readings from Last 3 Encounters:   09/28/21 139/74   07/14/21 130/72   06/30/21 (!) 172/80     Estimated Creatinine Clearance: 68.6 mL/min (by C-G formula based on SCr of 0.73 mg/dL). DIABETES ADHERENCE    Per Insurance Records through 12/15/21 (2020 [facility] = n/a; YTD PDC = 80%; Potential Fail Date: 12/21/21): Metformin 500 mg ER tab last filled on 7/10/21 for 90 day supply. Next refill due: 19/45/79  Trulicity 1.5 mg last filled 5/3/21 for 28 ds - see media tab, appears getting through PAP    Per Reconciled Dispense Report: 6/3/21 to 9/28/21  METFORMIN HYDROCHLORIDE ER  500 MG TB24 07/10/2021 90 90 Tablet LAILA LOBATO [id number] -. .. Per [facility]:   Metformin was again RTS since not picked up. Last picked up on 7/10/21 for 90 day supply. Refills remaining, billed through [employer]   Component Value Date/Time    Hemoglobin A1c 6.9 (H) 09/28/2021 04:19 PM    Hemoglobin A1c 6.8 (H) 03/26/2021 03:42 PM    Hemoglobin A1c 6.5 (H) 08/06/2020 09:50 AM    Hemoglobin A1c (POC) 6.5 09/25/2019 02:15 PM    Hemoglobin A1c (POC) 6.3 06/24/2019 03:00 PM     NOTE A1c <9%    STATIN ADHERENCE    Per Insurance Records through 12/15/21 (2020 [facility] = n/a; YTD PDC = filled only once, IMPOSSIBLE in 2021): Atorvastatin 20 mg tab last filled on 3/26/21 for 90 day supply.  Next refill due: 6/24/21  Continues on current med list (last Rx 9/28/21 x 90 ds, 3 refills) but no further claims in 2021    Lab Results   Component Value Date/Time    Cholesterol, total 208 (H) 09/28/2021 04:19 PM    HDL Cholesterol 48 09/28/2021 04:19 PM    LDL, calculated 90 09/28/2021 04:19 PM    LDL, calculated 99 08/06/2020 09:50 AM    VLDL, calculated 70 (H) 09/28/2021 04:19 PM    VLDL, calculated 33 08/06/2020 09:50 AM    Triglyceride 424 (H) 09/28/2021 04:19 PM     ALT (SGPT)   Date Value Ref Range Status   09/28/2021 15 0 - 32 IU/L Final     AST (SGOT)   Date Value Ref Range Status   09/28/2021 14 0 - 40 IU/L Final     The 10-year ASCVD risk score (Ajit Quan., et al., 2013) is: 32.6%    Values used to calculate the score:      Age: 66 years      Sex: Female      Is Non- : No      Diabetic: Yes      Tobacco smoker: No      Systolic Blood Pressure: 072 mmHg      Is BP treated: Yes      HDL Cholesterol: 48 mg/dL      Total Cholesterol: 208 mg/dL     PLAN  The following are interventions that have been identified:  - Patient overdue refilling metformin and active on home medication list   · Previously planned to  so pharmacy got ready again today  - atorvastatin overdue, one fill (impossible)    Attempting to reach patient to review.  Left message asking for return call.     Letter sent (did not yet read previous [employer] message)    Future Appointments   Date Time Provider Viktor Patterson   3/1/2022 11:30 AM Severo Dorado PA-C END BS ENDO   3/23/2022  8:45 AM FPA PM MISCELLANEOUS SSA FPA FPA   3/28/2022  2:15 PM Britany Guillen MD [facility] FPA FPA       Rakesh Dyson, PharmD, [facility]  Department, toll free: [phone]

## 2021-12-20 NOTE — LETTER
[address]  [address], [address]        Jose [place] Crowley   [facility] [address]           12/20/21     Dear Jose Luis Crowley,    We tried to reach you recently regarding your medication metformin, but were unable to reach you on the telephone. We have on file that you are currently taking metformin  mg tablet - 1 tablet everyday. If you are no longer taking or taking differently, please call us at the number below so that we can discuss this and update your medication profile. We wanted to make sure you still have this medication at home and that you are not having any troubles with it. It appears that this medication has not been filled at proper times and is overdue to be refilled. We are worried you might be missing doses or not taking it as directed. It is important that you take your medications regularly and try not to miss a single dose. Please contact us to discuss further or follow up with your [facility] pharmacy to refill it.     Some ways to help you remember to take and refill your medications are to:  · Use a pill box, set an alarm, and/or keep your medication near something that you do every day  · Fill a 3-month supply of your prescription at a time to save you time and trips to the pharmacy  · Ask your pharmacy if they participate in [facility]", a program where you can  all of your medications on the same day  · Ask your pharmacy if you can be set up with automatic refill, where they will automatically refill your prescription when it is due and let you know it's ready to     Sincerely,   Randy Casiano, PharmD, [facility]  Department, toll free: [phone]

## 2021-12-22 NOTE — TELEPHONE ENCOUNTER
Another attempt made to reach patient. No answer, LM.     For Pharmacy [id number] in [address]: No   Recommendation Provided To: Pharmacy: 1   Intervention Detail: Adherence Monitorin   Gap Closed?: No   Intervention Accepted By: Pharmacy: 1   Time Spent (min): 10

## 2022-03-18 PROBLEM — K43.0 INCISIONAL HERNIA WITH OBSTRUCTION BUT NO GANGRENE: Status: ACTIVE | Noted: 2017-02-27

## 2022-03-18 PROBLEM — D12.6 TUBULOVILLOUS ADENOMA OF COLON: Status: ACTIVE | Noted: 2017-04-28

## 2022-03-19 PROBLEM — M62.08 DIASTASIS OF RECTUS ABDOMINIS: Status: ACTIVE | Noted: 2017-02-27

## 2022-03-19 PROBLEM — H35.3211 EXUDATIVE AGE-RELATED MACULAR DEGENERATION OF RIGHT EYE WITH ACTIVE CHOROIDAL NEOVASCULARIZATION (HCC): Status: ACTIVE | Noted: 2021-09-28

## 2022-03-19 PROBLEM — E78.2 MIXED DIABETIC HYPERLIPIDEMIA ASSOCIATED WITH TYPE 2 DIABETES MELLITUS (HCC): Status: ACTIVE | Noted: 2020-08-24

## 2022-03-19 PROBLEM — E11.69 MIXED DIABETIC HYPERLIPIDEMIA ASSOCIATED WITH TYPE 2 DIABETES MELLITUS (HCC): Status: ACTIVE | Noted: 2020-08-24

## 2022-03-19 PROBLEM — E11.42 TYPE 2 DIABETES MELLITUS WITH DIABETIC POLYNEUROPATHY, WITHOUT LONG-TERM CURRENT USE OF INSULIN (HCC): Status: ACTIVE | Noted: 2017-02-15

## 2022-03-20 PROBLEM — E66.811 OBESITY, CLASS I, BMI 30.0-34.9 (SEE ACTUAL BMI): Status: ACTIVE | Noted: 2017-02-27

## 2022-03-20 PROBLEM — E66.9 OBESITY, CLASS I, BMI 30.0-34.9 (SEE ACTUAL BMI): Status: ACTIVE | Noted: 2017-02-27

## 2022-09-06 ENCOUNTER — OFFICE VISIT (OUTPATIENT)
Dept: ENDOCRINOLOGY | Age: 72
End: 2022-09-06
Payer: COMMERCIAL

## 2022-09-06 VITALS
BODY MASS INDEX: 31.83 KG/M2 | OXYGEN SATURATION: 97 % | WEIGHT: 173 LBS | SYSTOLIC BLOOD PRESSURE: 132 MMHG | HEIGHT: 62 IN | DIASTOLIC BLOOD PRESSURE: 80 MMHG | HEART RATE: 77 BPM

## 2022-09-06 DIAGNOSIS — E11.9 TYPE 2 DIABETES MELLITUS WITHOUT COMPLICATION, WITHOUT LONG-TERM CURRENT USE OF INSULIN (HCC): ICD-10-CM

## 2022-09-06 DIAGNOSIS — E78.2 MIXED HYPERLIPIDEMIA: ICD-10-CM

## 2022-09-06 DIAGNOSIS — E11.9 TYPE 2 DIABETES MELLITUS WITHOUT COMPLICATION, WITHOUT LONG-TERM CURRENT USE OF INSULIN (HCC): Primary | ICD-10-CM

## 2022-09-06 DIAGNOSIS — I10 ESSENTIAL (PRIMARY) HYPERTENSION: ICD-10-CM

## 2022-09-06 LAB
ANION GAP SERPL CALC-SCNC: 5 MMOL/L (ref 4–13)
BUN SERPL-MCNC: 18 MG/DL (ref 8–23)
CALCIUM SERPL-MCNC: 9.2 MG/DL (ref 8.3–10.4)
CHLORIDE SERPL-SCNC: 107 MMOL/L (ref 101–110)
CO2 SERPL-SCNC: 26 MMOL/L (ref 21–32)
CREAT SERPL-MCNC: 0.8 MG/DL (ref 0.6–1)
GLUCOSE SERPL-MCNC: 132 MG/DL (ref 65–100)
HBA1C MFR BLD: 7.8 %
POTASSIUM SERPL-SCNC: 4.4 MMOL/L (ref 3.5–5.1)
SODIUM SERPL-SCNC: 138 MMOL/L (ref 136–145)

## 2022-09-06 PROCEDURE — 99214 OFFICE O/P EST MOD 30 MIN: CPT | Performed by: PHYSICIAN ASSISTANT

## 2022-09-06 PROCEDURE — 1090F PRES/ABSN URINE INCON ASSESS: CPT | Performed by: PHYSICIAN ASSISTANT

## 2022-09-06 PROCEDURE — G8399 PT W/DXA RESULTS DOCUMENT: HCPCS | Performed by: PHYSICIAN ASSISTANT

## 2022-09-06 PROCEDURE — 1036F TOBACCO NON-USER: CPT | Performed by: PHYSICIAN ASSISTANT

## 2022-09-06 PROCEDURE — G8427 DOCREV CUR MEDS BY ELIG CLIN: HCPCS | Performed by: PHYSICIAN ASSISTANT

## 2022-09-06 PROCEDURE — 3046F HEMOGLOBIN A1C LEVEL >9.0%: CPT | Performed by: PHYSICIAN ASSISTANT

## 2022-09-06 PROCEDURE — 3017F COLORECTAL CA SCREEN DOC REV: CPT | Performed by: PHYSICIAN ASSISTANT

## 2022-09-06 PROCEDURE — 83036 HEMOGLOBIN GLYCOSYLATED A1C: CPT | Performed by: PHYSICIAN ASSISTANT

## 2022-09-06 PROCEDURE — 1123F ACP DISCUSS/DSCN MKR DOCD: CPT | Performed by: PHYSICIAN ASSISTANT

## 2022-09-06 PROCEDURE — G8417 CALC BMI ABV UP PARAM F/U: HCPCS | Performed by: PHYSICIAN ASSISTANT

## 2022-09-06 PROCEDURE — 2022F DILAT RTA XM EVC RTNOPTHY: CPT | Performed by: PHYSICIAN ASSISTANT

## 2022-09-06 RX ORDER — DAPAGLIFLOZIN 5 MG/1
5 TABLET, FILM COATED ORAL EVERY MORNING
Qty: 30 TABLET | Refills: 11 | Status: SHIPPED | OUTPATIENT
Start: 2022-09-06

## 2022-09-06 RX ORDER — DULAGLUTIDE 3 MG/.5ML
3 INJECTION, SOLUTION SUBCUTANEOUS WEEKLY
COMMUNITY

## 2022-09-06 NOTE — PROGRESS NOTES
[facility]    Mikie Sullivan PA-C  [facility]  [address], [address]  [place]  Phone [phone]  Facsimile [phone]          Pierre Meckel is a 67 y.o. female seen 9/6/2022 for follow up evaluation of type 2 diabetes         Assessment and Plan:    In office COVID-19 PPE worn and precautions taken    1. Type 2 diabetes mellitus without complication, without long-term current use of insulin ([address]  Patient returns clinic for follow-up with suboptimal glycemic control on Trulicity monotherapy despite dose increase. We had lengthy discussion regarding diet and exercise. She is seeking treatment for her leg pain which I have encouraged her to discuss with primary care. I have also encouraged her to better manage her blood sugar and to exercise daily for overall improvement of medical health and musculoskeletal pain. Continue weekly Trulicity. We will start patient on farxiga 5 mg. Most recent renal function is stable, check BMP today. We will repeat BMP at future visit. Patient denies history of diabetic ketoacidosis. We discussed increased risk of urinary tract and genital mycotic infections as well as basic hygiene that may help to prevent infection. At today's visit we discussed sequela associated with uncontrolled diabetes including increased risk of stroke, heart attack, kidney failure, amputation, retinopathy, neuropathy, and nephropathy. Patient was strongly encouraged to comply with treatment regimen as well as dietary and exercise recommendations to aid in control of this chronic disease to help prevent complications associated with uncontrolled diabetes. - AMB POC HEMOGLOBIN A1C  - FARXIGA 5 MG tablet; Take 1 tablet by mouth every morning  Dispense: 30 tablet; Refill: 11  - Basic Metabolic Panel; Future  - Comprehensive Metabolic Panel;  Future  - CBC with Auto Differential; Future  - Microalbumin / Creatinine Urine Ratio; Future  - Lipid Panel; Future  - Hemoglobin A1C; Future  - TSH with Reflex; Future    2. Essential (primary) hypertension  BP Readings from Last 3 Encounters:   09/06/22 132/80   03/28/22 134/78   03/01/22 126/82         3. Mixed hyperlipidemia  Not currently taking prescribed atorvastatin 20mg, recheck fasting labs prior to next OV  - Lipid Panel; Future          Nicolasa Brannon was seen today for follow-up and diabetes. Diagnoses and all orders for this visit:    Type 2 diabetes mellitus without complication, without long-term current use of insulin (HCC)  -     AMB POC HEMOGLOBIN A1C  -     FARXIGA 5 MG tablet; Take 1 tablet by mouth every morning  -     Basic Metabolic Panel; Future  -     Comprehensive Metabolic Panel; Future  -     CBC with Auto Differential; Future  -     Microalbumin / Creatinine Urine Ratio; Future  -     Lipid Panel; Future  -     Hemoglobin A1C; Future  -     TSH with Reflex; Future    Essential (primary) hypertension    Mixed hyperlipidemia  -     Lipid Panel; Future          History of Present Illness:    9/6/2022  Pt RTC for follow up on diabetes on monotherapy with trulicity. No changes to lifestyle. Continues to work at [employer] multiple days per week and remains active. Using fast food often. Does not drink high calorie beverages. Denies exercise routine. C/o bilateral LE pain, worse in calves        Current Regimen  Trulicity 3mg weekly on thursday      3/1/2022   Previously at goal with GLP-1 and metformin. Intolerant of metformin with diarrhea, discontinued with improvement of diarrhea, multiple recent ophtho appt for macular degeneration of right eye, cataract surgery as well             9/1/2021   VIRTUAL VISIT   Karyn Baum is a 66 y.o. female who was seen by synchronous (real-time) audio-video technology on 9/1/2021 . The patient provided consent for this audio-video interaction.   The [employer] platform was used. Patient and provider were located  at their individual homes. Recent foot injury limiting mobility and causing significant pain. 2/1/2021   VIRTUAL VISIT   Roc Rinaldi is a 69 y.o. female who was seen by synchronous (real-time) audio-video technology on 2/1/2021 . The patient provided consent for this audio-video interaction. The [employer] platform was used. Patient and provider  were located at their individual homes. Pt doing well, got first COVID Vaccine                  Review of Records: pt requests evaluation for uncontrolled diabetes               Date of diagnosis: late 1990's   Found after poor healing leg wound       Failed metformin with severe GI upset   Intolerant to Januvia with nasal congestion and watery eye   Had RX for tradjenta but does not recall taking it   Unable to recall SE from actos           Denies HX of pancreatitis, DKA, gastroparesis       06/24/2019   Pt RTC for f/u after started GLP-1 therapy with trulicity. Doing well with current regimen, some mild hypoglycemia late day       7/31/2020   VIRTUAL VISIT   Roc Rinaldi is a 78 y.o. female who was seen by synchronous (real-time) audio-video technology on 7/31/2020 . The patient provided consent for this audio-video interaction. The [employer] platform was used. Patient was located  at their job and provider at  home. 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       Notes increase in blood glucose when fasting       Body weight trend: stable                             Wt Readings from Last 3 Encounters:   09/06/22 173 lb (78.5 kg)   03/28/22 177 lb (80.3 kg)   03/01/22 176 lb (79.8 kg)              Wt Readings from Last 3 Encounters:        03/01/22  176 lb (79.8 kg)     09/28/21  178 lb 3.2 oz (80.8 kg)        07/14/21  176 lb 11.2 oz (80.2 kg) Wt Readings from Last 3 Encounters:        06/22/20  174 lb (78.9 kg)     03/03/20  173 lb 3.2 oz (78.6 kg)     09/25/19  173 lb (78.5 kg)             Pregnancy: s/p menapause       Diabetes education: The patient has not received formal diabetes education. Diabetic complications:                   Retinopathy: Last eye exam was ~march 2022 and demonstrated no diabetic retinopathy under  treatment for macular degeneration.   Eye care specialist is Dr. Aurelia Montesinos, retinal eye care Dr. Marge Arreola for macular degeneration                    Albuminuria/nephropathy:                           02/19/2019      Cr 0.72, GFR 86, microalbumin/Cr ratio 7.5                           09/16/2019      Cr 0.86, GFR 69, microalbumin/Cr ratio 3.4                                   03/26/2021      Cr 0.76, GFR 79, microalbumin/Cr ratio 9                                  09/28/2021      Cr 0.73, GFR 83     03/01/2022 Cr 0.86, GFR 72, microalbumin/Cr ratio 9                     Neuropathy:  numbness and tingling, intermittent, improved with gabapentin           Home blood glucose monitoring frequency: <1 times per day    By recall     Typical Standard Deviation   Fasting Low to mid 100 As low as 128   AC lunch     AC supper     Bedtime       Blood glucose levels are uncontrolled, by review of A1c       Hypoglycemia: never       Hyperglycemia: near daily, >300, symptoms wiped out       Hemoglobin A1c:               02/19/2019      10.0%               04/14/2019      11.0%               06/24/2019      6.3%               09/25/2019      6.5%               03/03/2020      6.3%                   08/06/2020      6.5%                       03/26/2021      6.8%                      03/01/2022      7.6%    09/06/2022 7.8%           Other pertinent labs:                   Lipids:                            02/19/2019  TC- 192, LDL- 98, VLDL- 42,  HDL- 52, TG- 210                           09/16/2019  TC- 178, LDL- 89, VLDL- 36,  HDL- 53, TG- 180                           08/06/2020  TC- 187, LDL- 99, VLDL- 33,  HDL- 55, TG- 167     09/28/2021  TC- 208, LDL- 90, VLDL- 70,  HDL- 48, TG- 424                         Thyroid:                                                       05/18/2018      2.080                           04/23/2019      1.740                           08/06/2020      1.910     03/01/2022 1.750                       Allergies & Medications:  Reviewed in chart. Review of Systems   Musculoskeletal:  Positive for myalgias. Vital Signs:  /80   Pulse 77   Ht 5' 2\" (1.575 m)   Wt 173 lb (78.5 kg)   SpO2 97%   BMI 31.64 kg/m²       Physical Exam  Constitutional:       Appearance: Normal appearance. HENT:      Head: Normocephalic. Neck:      Thyroid: No thyroid mass or thyromegaly. Vascular: No carotid bruit. Cardiovascular:      Rate and Rhythm: Normal rate and regular rhythm. Pulmonary:      Effort: Pulmonary effort is normal.      Breath sounds: Normal breath sounds. Abdominal:      Palpations: Abdomen is soft. Musculoskeletal:      Cervical back: Neck supple. Right lower leg: No edema. Left lower leg: No edema. Feet:      Right foot:      Protective Sensation: 3 sites tested. 3 sites sensed. Skin integrity: Skin integrity normal.      Left foot:      Protective Sensation: 3 sites tested. 3 sites sensed. Skin integrity: Skin integrity normal.   Lymphadenopathy:      Cervical: No cervical adenopathy. Skin:     General: Skin is warm and dry. Neurological:      General: No focal deficit present. Mental Status: She is alert. Sensory: Sensation is intact. Psychiatric:         Mood and Affect: Mood normal.         Behavior: Behavior normal.         Thought Content: Thought content normal.         Judgment: Judgment normal.           Return in about 16 weeks (around 12/27/2022) for Diabetes DM2 Follow-Up.         Portions of this note were generated with the assistance of voice recogniton software. As such, some errors in transcription may be present.

## 2022-09-08 ENCOUNTER — TELEPHONE (OUTPATIENT)
Dept: FAMILY MEDICINE CLINIC | Facility: CLINIC | Age: 72
End: 2022-09-08

## 2022-09-08 DIAGNOSIS — U07.1 COVID: Primary | ICD-10-CM

## 2022-09-08 RX ORDER — NIRMATRELVIR AND RITONAVIR 300-100 MG
KIT ORAL
Qty: 30 TABLET | Refills: 0 | Status: SHIPPED | OUTPATIENT
Start: 2022-09-08 | End: 2022-09-13

## 2022-09-08 NOTE — TELEPHONE ENCOUNTER
Paxlovid sent in for her. Monitor her oxygen level and if it goes below 90% she is to seek emergency medical attention. Stay at home for 5 days and then she can go out wearing a well fitting N95 mask for 5 more days.

## 2022-09-08 NOTE — TELEPHONE ENCOUNTER
Pt called stating that she is Positive for COVID. Asking for the Paxlovid to be sent to the Pharmacy. Last OV 3/28/22.  Next OV 9/28/22

## 2022-09-20 ENCOUNTER — TELEPHONE (OUTPATIENT)
Dept: FAMILY MEDICINE CLINIC | Facility: CLINIC | Age: 72
End: 2022-09-20

## 2022-09-20 NOTE — TELEPHONE ENCOUNTER
FYI-  Daughter called, patient did have two falls recently. They both forgot, wanted  to know he was right.     Pt called stating that she has recently had COVID. Tested negative today. Starting to feel horrible. Today is the worse day.  Asking for an appt

## 2022-09-28 ENCOUNTER — OFFICE VISIT (OUTPATIENT)
Dept: FAMILY MEDICINE CLINIC | Facility: CLINIC | Age: 72
End: 2022-09-28
Payer: COMMERCIAL

## 2022-09-28 VITALS
HEIGHT: 62 IN | RESPIRATION RATE: 16 BRPM | OXYGEN SATURATION: 98 % | WEIGHT: 172.4 LBS | TEMPERATURE: 97.3 F | DIASTOLIC BLOOD PRESSURE: 73 MMHG | BODY MASS INDEX: 31.73 KG/M2 | HEART RATE: 75 BPM | SYSTOLIC BLOOD PRESSURE: 134 MMHG

## 2022-09-28 DIAGNOSIS — E11.9 TYPE 2 DIABETES MELLITUS WITHOUT COMPLICATION, WITHOUT LONG-TERM CURRENT USE OF INSULIN (HCC): ICD-10-CM

## 2022-09-28 DIAGNOSIS — H35.3211 EXUDATIVE AGE-RELATED MACULAR DEGENERATION OF RIGHT EYE WITH ACTIVE CHOROIDAL NEOVASCULARIZATION (HCC): ICD-10-CM

## 2022-09-28 DIAGNOSIS — E11.69 MIXED DIABETIC HYPERLIPIDEMIA ASSOCIATED WITH TYPE 2 DIABETES MELLITUS (HCC): ICD-10-CM

## 2022-09-28 DIAGNOSIS — Z12.11 SCREENING FOR MALIGNANT NEOPLASM OF COLON: ICD-10-CM

## 2022-09-28 DIAGNOSIS — E11.42 TYPE 2 DIABETES MELLITUS WITH DIABETIC POLYNEUROPATHY, WITHOUT LONG-TERM CURRENT USE OF INSULIN (HCC): ICD-10-CM

## 2022-09-28 DIAGNOSIS — E78.2 MIXED DIABETIC HYPERLIPIDEMIA ASSOCIATED WITH TYPE 2 DIABETES MELLITUS (HCC): ICD-10-CM

## 2022-09-28 DIAGNOSIS — Z12.31 SCREENING MAMMOGRAM FOR HIGH-RISK PATIENT: ICD-10-CM

## 2022-09-28 DIAGNOSIS — I10 ESSENTIAL HYPERTENSION, BENIGN: Primary | ICD-10-CM

## 2022-09-28 DIAGNOSIS — Z23 NEED FOR IMMUNIZATION AGAINST INFLUENZA: ICD-10-CM

## 2022-09-28 PROCEDURE — 1036F TOBACCO NON-USER: CPT | Performed by: FAMILY MEDICINE

## 2022-09-28 PROCEDURE — G0008 ADMIN INFLUENZA VIRUS VAC: HCPCS | Performed by: FAMILY MEDICINE

## 2022-09-28 PROCEDURE — G8427 DOCREV CUR MEDS BY ELIG CLIN: HCPCS | Performed by: FAMILY MEDICINE

## 2022-09-28 PROCEDURE — 3017F COLORECTAL CA SCREEN DOC REV: CPT | Performed by: FAMILY MEDICINE

## 2022-09-28 PROCEDURE — 1123F ACP DISCUSS/DSCN MKR DOCD: CPT | Performed by: FAMILY MEDICINE

## 2022-09-28 PROCEDURE — 1090F PRES/ABSN URINE INCON ASSESS: CPT | Performed by: FAMILY MEDICINE

## 2022-09-28 PROCEDURE — 99213 OFFICE O/P EST LOW 20 MIN: CPT | Performed by: FAMILY MEDICINE

## 2022-09-28 PROCEDURE — 2022F DILAT RTA XM EVC RTNOPTHY: CPT | Performed by: FAMILY MEDICINE

## 2022-09-28 PROCEDURE — 3046F HEMOGLOBIN A1C LEVEL >9.0%: CPT | Performed by: FAMILY MEDICINE

## 2022-09-28 PROCEDURE — G8417 CALC BMI ABV UP PARAM F/U: HCPCS | Performed by: FAMILY MEDICINE

## 2022-09-28 PROCEDURE — G8399 PT W/DXA RESULTS DOCUMENT: HCPCS | Performed by: FAMILY MEDICINE

## 2022-09-28 PROCEDURE — 90694 VACC AIIV4 NO PRSRV 0.5ML IM: CPT | Performed by: FAMILY MEDICINE

## 2022-09-28 RX ORDER — GABAPENTIN 300 MG/1
CAPSULE ORAL
Qty: 90 CAPSULE | Refills: 3 | Status: SHIPPED | OUTPATIENT
Start: 2022-09-28 | End: 2023-09-21

## 2022-09-28 RX ORDER — OLMESARTAN MEDOXOMIL 40 MG/1
40 TABLET ORAL DAILY
Qty: 90 TABLET | Refills: 3 | Status: SHIPPED | OUTPATIENT
Start: 2022-09-28

## 2022-09-28 ASSESSMENT — PATIENT HEALTH QUESTIONNAIRE - PHQ9
SUM OF ALL RESPONSES TO PHQ QUESTIONS 1-9: 0
SUM OF ALL RESPONSES TO PHQ QUESTIONS 1-9: 0
2. FEELING DOWN, DEPRESSED OR HOPELESS: 0
1. LITTLE INTEREST OR PLEASURE IN DOING THINGS: 0
SUM OF ALL RESPONSES TO PHQ9 QUESTIONS 1 & 2: 0
SUM OF ALL RESPONSES TO PHQ QUESTIONS 1-9: 0
SUM OF ALL RESPONSES TO PHQ QUESTIONS 1-9: 0

## 2022-10-14 ENCOUNTER — TELEPHONE (OUTPATIENT)
Dept: ENDOCRINOLOGY | Age: 72
End: 2022-10-14

## 2022-10-14 NOTE — TELEPHONE ENCOUNTER
Patient left a message stating she may be having a reaction to Lavanda Jameson. She had COVID after her last visit with Jeff and states she did not start Lavanda Jameson until last week due to her being sick. She is now having symptoms of shortness of breath, soreness in chest, coughing, fatigue, and rash on face that started last week and has been worsening. She states the symptoms didn't start until she started Lavanda Jameson. She states she has been experiencing some elevated blood sugar readings also but she did not have her meter on hand at work to give a log of readings. She said she was  167 this morning before eating and this is typically around the range she runs.

## 2022-10-14 NOTE — TELEPHONE ENCOUNTER
Shaila Taylor PA-C  You 3 hours ago (1:48 PM)     LR  Please make sure patient STOPS Jasmin Steve, takes benadryl if needed and goes to ER if breathing issues. If fever/chills, may need evaluation for illness   I can call her a little later today      Patient expressed understanding and states she has stopped medication and will take benadryl as needed for rash. She states she will keep in mind to visit her local ER if needed for her shortness of breath or any other symptoms.

## 2023-01-12 ENCOUNTER — OFFICE VISIT (OUTPATIENT)
Dept: ENDOCRINOLOGY | Age: 73
End: 2023-01-12
Payer: COMMERCIAL

## 2023-01-12 ENCOUNTER — TELEPHONE (OUTPATIENT)
Dept: ENDOCRINOLOGY | Age: 73
End: 2023-01-12

## 2023-01-12 VITALS — BODY MASS INDEX: 31.83 KG/M2 | DIASTOLIC BLOOD PRESSURE: 75 MMHG | SYSTOLIC BLOOD PRESSURE: 125 MMHG | WEIGHT: 174 LBS

## 2023-01-12 DIAGNOSIS — I10 ESSENTIAL HYPERTENSION, BENIGN: ICD-10-CM

## 2023-01-12 DIAGNOSIS — E11.42 TYPE 2 DIABETES MELLITUS WITH DIABETIC POLYNEUROPATHY, WITHOUT LONG-TERM CURRENT USE OF INSULIN (HCC): Primary | ICD-10-CM

## 2023-01-12 DIAGNOSIS — E11.69 MIXED DIABETIC HYPERLIPIDEMIA ASSOCIATED WITH TYPE 2 DIABETES MELLITUS (HCC): ICD-10-CM

## 2023-01-12 DIAGNOSIS — M79.10 MYALGIA DUE TO STATIN: ICD-10-CM

## 2023-01-12 DIAGNOSIS — T46.6X5A MYALGIA DUE TO STATIN: ICD-10-CM

## 2023-01-12 DIAGNOSIS — E78.2 MIXED DIABETIC HYPERLIPIDEMIA ASSOCIATED WITH TYPE 2 DIABETES MELLITUS (HCC): ICD-10-CM

## 2023-01-12 LAB — HBA1C MFR BLD: 7 %

## 2023-01-12 PROCEDURE — G8417 CALC BMI ABV UP PARAM F/U: HCPCS | Performed by: PHYSICIAN ASSISTANT

## 2023-01-12 PROCEDURE — 3074F SYST BP LT 130 MM HG: CPT | Performed by: PHYSICIAN ASSISTANT

## 2023-01-12 PROCEDURE — G8399 PT W/DXA RESULTS DOCUMENT: HCPCS | Performed by: PHYSICIAN ASSISTANT

## 2023-01-12 PROCEDURE — 3046F HEMOGLOBIN A1C LEVEL >9.0%: CPT | Performed by: PHYSICIAN ASSISTANT

## 2023-01-12 PROCEDURE — 3078F DIAST BP <80 MM HG: CPT | Performed by: PHYSICIAN ASSISTANT

## 2023-01-12 PROCEDURE — 83036 HEMOGLOBIN GLYCOSYLATED A1C: CPT | Performed by: PHYSICIAN ASSISTANT

## 2023-01-12 PROCEDURE — 1036F TOBACCO NON-USER: CPT | Performed by: PHYSICIAN ASSISTANT

## 2023-01-12 PROCEDURE — G8484 FLU IMMUNIZE NO ADMIN: HCPCS | Performed by: PHYSICIAN ASSISTANT

## 2023-01-12 PROCEDURE — G8427 DOCREV CUR MEDS BY ELIG CLIN: HCPCS | Performed by: PHYSICIAN ASSISTANT

## 2023-01-12 PROCEDURE — 2022F DILAT RTA XM EVC RTNOPTHY: CPT | Performed by: PHYSICIAN ASSISTANT

## 2023-01-12 PROCEDURE — 3017F COLORECTAL CA SCREEN DOC REV: CPT | Performed by: PHYSICIAN ASSISTANT

## 2023-01-12 PROCEDURE — 1090F PRES/ABSN URINE INCON ASSESS: CPT | Performed by: PHYSICIAN ASSISTANT

## 2023-01-12 PROCEDURE — 99214 OFFICE O/P EST MOD 30 MIN: CPT | Performed by: PHYSICIAN ASSISTANT

## 2023-01-12 PROCEDURE — 1123F ACP DISCUSS/DSCN MKR DOCD: CPT | Performed by: PHYSICIAN ASSISTANT

## 2023-01-12 RX ORDER — DULAGLUTIDE 4.5 MG/.5ML
4.5 INJECTION, SOLUTION SUBCUTANEOUS WEEKLY
Qty: 12 ADJUSTABLE DOSE PRE-FILLED PEN SYRINGE | Refills: 3 | Status: SHIPPED | OUTPATIENT
Start: 2023-01-12

## 2023-01-12 NOTE — PROGRESS NOTES
[facility]    Eddie Palacios PA-C  [facility]  [address], [address]  [place]  Phone [phone]  Facsimile [phone]          Sujit Postal is a 67 y.o. female seen 1/12/2023 for follow up evaluation of type 2 diabetes        Assessment and Plan:    In office COVID-19 PPE worn and precautions taken    1. Type 2 diabetes mellitus with diabetic polyneuropathy, without long-term current use of insulin ([facility] [address]  Patient with type 2 diabetes exhibiting fairly stable glycemic control. A1c is improved. She did not tolerate farxiga trial with severe reaction. Although she may benefit from challenging SGLT2 eye in the future I would proceed with caution. Today we will increase Trulicity from 3 mg up to 4.5 mg weekly. Diet and exercise discussed at length. Patient has been drinking more protein shakes instead of breakfast and trying to include protein into her diet more regularly. She will now try to include more fiber as well      - TRULICITY 4.5 EX/8.2JV SOPN; Inject 4.5 mg into the skin once a week  Dispense: 12 Adjustable Dose Pre-filled Pen Syringe; Refill: 3  - AMB POC HEMOGLOBIN A1C  -  DIABETES FOOT EXAM    2. Essential hypertension, benign  BP Readings from Last 3 Encounters:   01/12/23 125/75   09/28/22 134/73   09/06/22 132/80         3. Mixed diabetic hyperlipidemia associated with type 2 diabetes mellitus ([facility] [address]  Patient reportedly statin intolerant. Encouraged high-fiber diet    4. Myalgia due to statin  Patient reportedly statin intolerant. Encouraged high-fiber diet          Mike Cee was seen today for diabetes.     Diagnoses and all orders for this visit:    Type 2 diabetes mellitus with diabetic polyneuropathy, without long-term current use of insulin ([facility])  -     TRULICITY 4.5 ZR/0.8XN SOPN; Inject 4.5 mg into the skin once a week  -     AMB POC HEMOGLOBIN A1C  -      DIABETES Is This A New Presentation, Or A Follow-Up?: Skin Lesion FOOT EXAM    Essential hypertension, benign    Mixed diabetic hyperlipidemia associated with type 2 diabetes mellitus ([facility])    Myalgia due to statin          History of Present Illness:        1/12/2023   Interim diabetes HPI:    Patient returns clinic for follow-up of type 2 diabetes currently on monotherapy with Trulicity. She has had multiple reactions to a variety of medications. At last visit she started farxiga and developed shortness of breath with and thus it was discontinued. Of note she also developed COVID-19 around the same time    Interim medical history changes:   S/p COVID-19 fall 2022  Did not obtain labs prior to visit as ordered    Lifestyle Update:  Having daily protein drink  Continues to work at [employer]  Experiencing back pain that limits activity   Is lonely, living at home    Current Regimen: trulicity 3mg weekly     Glucose data:    Home blood glucose monitoring frequency: ~1 times per day    By recall     Typical Standard Deviation   Fasting Low 100 As low as l120   AC lunch     AC supper     Bedtime       Blood glucose levels are stable      Failed past therapies:   SOB with Farxiga    Failed metformin with severe GI upset   Intolerant to Januvia with nasal congestion and watery eye   Had RX for tradjenta but does not recall taking it   Unable to recall SE from actos        Relevant co morbidities:    Denies HX pancreatitis, DKA, gastroparesis, foot ulcer    Optho:  Last eye exam was Jan 2023 and demonstrated no diabetic retinopathy under  treatment for macular degeneration. Eye care specialist is Dr. Tylor Hodge, retinal eye care Dr. Panda Yip for macular degeneration          Obesity:         Body mass index is 31.83 kg/m².        stable      Wt Readings from Last 3 Encounters:   01/12/23 174 lb (78.9 kg)   09/28/22 172 lb 6.4 oz (78.2 kg)   09/06/22 173 lb (78.5 kg)         CardioVascular:    None     Renal:    Under care of nephro? no    On ARB/ACE-I  olmesartan - 40 MG       02/19/2019 How Severe Is Your Skin Lesion?: mild Cr 0.72, GFR 86, microalbumin/Cr ratio 7.5                           09/16/2019      Cr 0.86, GFR 69, microalbumin/Cr ratio 3.4                                   03/26/2021      Cr 0.76, GFR 79, microalbumin/Cr ratio 9                                  09/28/2021      Cr 0.73, GFR 83                                  03/01/2022      Cr 0.86, GFR 72, microalbumin/Cr ratio 9     09/06/2022 Cr 0.80, GFR >60     Lipids:     Current therapy This patient does not have an active medication from one of the medication groupers.  Suffers from statin myalgias                           02/19/2019  TC- 192, LDL- 98, VLDL- 42,  HDL- 52, TG- 210                           09/16/2019  TC- 178, LDL- 89, VLDL- 36,  HDL- 53, TG- 180                           08/06/2020  TC- 187, LDL- 99, VLDL- 33,  HDL- 55, TG- 167                              09/28/2021  TC- 208, LDL- 90, VLDL- 70,  HDL- 48, TG- 424    03/28/2022  TC- 198, LDL- 110, VLDL- 33,  HDL- 48, TG- 233        Lab Results   Component Value Date    CHOL 198 03/28/2022    CHOL 208 (H) 09/28/2021    CHOL 199 03/26/2021     Lab Results   Component Value Date    LDLCALC 110 (H) 03/28/2022    LDLCALC 90 09/28/2021    LDLCALC 113 (H) 03/26/2021      Lab Results   Component Value Date    LABVLDL 33 08/06/2020    LABVLDL 36 09/16/2019    VLDL 40 03/28/2022    VLDL 70 (H) 09/28/2021    VLDL 36 03/26/2021      Lab Results   Component Value Date    HDL 48 03/28/2022    HDL 48 09/28/2021    HDL 50 03/26/2021      Lab Results   Component Value Date    HDL 48 03/28/2022    HDL 48 09/28/2021    HDL 50 03/26/2021      Lab Results   Component Value Date    TRIG 233 (H) 03/28/2022    TRIG 424 (H) 09/28/2021    TRIG 209 (H) 03/26/2021     No results found for: CHOLHDLRATIO      Hemoglobin A1c:               02/19/2019      10.0%               04/14/2019      11.0%               06/24/2019      6.3%               09/25/2019      6.5%               03/03/2020      6.3%                   08/06/2020 6.5%                       03/26/2021      6.8%                      03/01/2022      7.6%                      09/06/2022      7.8%    01/12/2023 7.0%  Hemoglobin A1C, POC   Date Value Ref Range Status   01/12/2023 7.0 % Final   09/06/2022 7.8 % Final   03/01/2022 7.6 % Final        Thyroid:                            05/18/2018      2.080                           04/23/2019      1.740                           08/06/2020      1.910                              03/01/2022      1.750          Lab Results   Component Value Date/Time    TSH 1.750 03/01/2022 12:05 PM    TSH 1.910 08/06/2020 09:50 AM                      Allergies & Medications:  Reviewed in chart. Review of Systems   Genitourinary:  Negative for difficulty urinating. Musculoskeletal:         Right back pain with radiation down right leg   Neurological:         No saddle anesthesia      Vital Signs:  /75 (Site: Right Upper Arm, Position: Sitting)   Wt 174 lb (78.9 kg)   BMI 31.83 kg/m²       Physical Exam  Constitutional:       Appearance: Normal appearance. HENT:      Head: Normocephalic. Neck:      Thyroid: No thyroid mass or thyromegaly. Vascular: No carotid bruit. Cardiovascular:      Rate and Rhythm: Normal rate and regular rhythm. Pulmonary:      Effort: Pulmonary effort is normal.      Breath sounds: Normal breath sounds. Abdominal:      Palpations: Abdomen is soft. Musculoskeletal:      Cervical back: Neck supple. Right lower leg: No edema. Left lower leg: No edema. Feet:      Right foot:      Protective Sensation: 3 sites tested. 3 sites sensed. Skin integrity: Skin integrity normal.      Left foot:      Protective Sensation: 3 sites tested. 3 sites sensed. Skin integrity: Skin integrity normal.   Lymphadenopathy:      Cervical: No cervical adenopathy. Skin:     General: Skin is warm and dry. Neurological:      General: No focal deficit present. Mental Status: She is alert. Sensory: Sensation is intact. Psychiatric:         Mood and Affect: Mood normal.         Behavior: Behavior normal.         Thought Content: Thought content normal.         Judgment: Judgment normal.           Return in about 6 months (around 7/12/2023) for Diabetes DM2 Follow-Up. Portions of this note were generated with the assistance of voice recogniton software. As such, some errors in transcription may be present.

## 2023-01-12 NOTE — TELEPHONE ENCOUNTER
Came in for an appt today with August Hitchcock. She filled out a [facility] Patient Assistance application for [employer]. She missed signing page 4 the consent sheet so put it in the mail for her to sign and send back. Placed application in Shaila's box to be signed.

## 2023-02-08 NOTE — TELEPHONE ENCOUNTER
Patient came in today and brought her signed authorization page for [facility] Patient Assistance application. Faxed it to fx#[phone].

## 2023-03-07 NOTE — TELEPHONE ENCOUNTER
3/6/23 Received fax from [facility] Patient Assistance stating Additional Information Needed - please contact [facility] to discuss. Called and spoke to Nancy regarding the above information and gave her [employer] phone #. She will reach out to them.

## 2023-03-09 ENCOUNTER — TELEPHONE (OUTPATIENT)
Dept: ENDOCRINOLOGY | Age: 73
End: 2023-03-09

## 2023-03-10 ENCOUNTER — TELEPHONE (OUTPATIENT)
Dept: FAMILY MEDICINE CLINIC | Facility: CLINIC | Age: 73
End: 2023-03-10

## 2023-03-10 DIAGNOSIS — E11.69 MIXED DIABETIC HYPERLIPIDEMIA ASSOCIATED WITH TYPE 2 DIABETES MELLITUS (HCC): Primary | ICD-10-CM

## 2023-03-10 DIAGNOSIS — I10 ESSENTIAL HYPERTENSION, BENIGN: ICD-10-CM

## 2023-03-10 DIAGNOSIS — E78.2 MIXED DIABETIC HYPERLIPIDEMIA ASSOCIATED WITH TYPE 2 DIABETES MELLITUS (HCC): Primary | ICD-10-CM

## 2023-04-10 ENCOUNTER — NURSE ONLY (OUTPATIENT)
Dept: FAMILY MEDICINE CLINIC | Facility: CLINIC | Age: 73
End: 2023-04-10

## 2023-04-10 DIAGNOSIS — E78.2 MIXED DIABETIC HYPERLIPIDEMIA ASSOCIATED WITH TYPE 2 DIABETES MELLITUS (HCC): ICD-10-CM

## 2023-04-10 DIAGNOSIS — E11.69 MIXED DIABETIC HYPERLIPIDEMIA ASSOCIATED WITH TYPE 2 DIABETES MELLITUS (HCC): ICD-10-CM

## 2023-04-10 LAB
CHOLEST SERPL-MCNC: 175 MG/DL
HDLC SERPL-MCNC: 49 MG/DL (ref 40–60)
HDLC SERPL: 3.6
LDLC SERPL CALC-MCNC: 85.4 MG/DL
TRIGL SERPL-MCNC: 203 MG/DL (ref 35–150)
VLDLC SERPL CALC-MCNC: 40.6 MG/DL (ref 6–23)

## 2023-04-17 ENCOUNTER — OFFICE VISIT (OUTPATIENT)
Dept: FAMILY MEDICINE CLINIC | Facility: CLINIC | Age: 73
End: 2023-04-17
Payer: COMMERCIAL

## 2023-04-17 VITALS
RESPIRATION RATE: 16 BRPM | BODY MASS INDEX: 30.18 KG/M2 | DIASTOLIC BLOOD PRESSURE: 70 MMHG | HEART RATE: 80 BPM | OXYGEN SATURATION: 98 % | SYSTOLIC BLOOD PRESSURE: 125 MMHG | HEIGHT: 62 IN | TEMPERATURE: 97.3 F | WEIGHT: 164 LBS

## 2023-04-17 DIAGNOSIS — E66.9 OBESITY, CLASS I, BMI 30.0-34.9 (SEE ACTUAL BMI): ICD-10-CM

## 2023-04-17 DIAGNOSIS — I10 ESSENTIAL HYPERTENSION, BENIGN: ICD-10-CM

## 2023-04-17 DIAGNOSIS — H35.3211 EXUDATIVE AGE-RELATED MACULAR DEGENERATION OF RIGHT EYE WITH ACTIVE CHOROIDAL NEOVASCULARIZATION (HCC): ICD-10-CM

## 2023-04-17 DIAGNOSIS — E11.9 TYPE 2 DIABETES MELLITUS WITHOUT COMPLICATION, WITHOUT LONG-TERM CURRENT USE OF INSULIN (HCC): ICD-10-CM

## 2023-04-17 DIAGNOSIS — D22.9 ATYPICAL NEVI: ICD-10-CM

## 2023-04-17 DIAGNOSIS — E11.69 MIXED DIABETIC HYPERLIPIDEMIA ASSOCIATED WITH TYPE 2 DIABETES MELLITUS (HCC): ICD-10-CM

## 2023-04-17 DIAGNOSIS — E11.42 TYPE 2 DIABETES MELLITUS WITH DIABETIC POLYNEUROPATHY, WITHOUT LONG-TERM CURRENT USE OF INSULIN (HCC): ICD-10-CM

## 2023-04-17 DIAGNOSIS — E78.2 MIXED DIABETIC HYPERLIPIDEMIA ASSOCIATED WITH TYPE 2 DIABETES MELLITUS (HCC): ICD-10-CM

## 2023-04-17 DIAGNOSIS — Z12.11 SCREENING FOR MALIGNANT NEOPLASM OF COLON: ICD-10-CM

## 2023-04-17 DIAGNOSIS — M79.645 PAIN OF FINGER OF LEFT HAND: ICD-10-CM

## 2023-04-17 DIAGNOSIS — Z00.00 MEDICARE ANNUAL WELLNESS VISIT, SUBSEQUENT: Primary | ICD-10-CM

## 2023-04-17 PROCEDURE — 1123F ACP DISCUSS/DSCN MKR DOCD: CPT | Performed by: FAMILY MEDICINE

## 2023-04-17 PROCEDURE — 3078F DIAST BP <80 MM HG: CPT | Performed by: FAMILY MEDICINE

## 2023-04-17 PROCEDURE — 3046F HEMOGLOBIN A1C LEVEL >9.0%: CPT | Performed by: FAMILY MEDICINE

## 2023-04-17 PROCEDURE — G0439 PPPS, SUBSEQ VISIT: HCPCS | Performed by: FAMILY MEDICINE

## 2023-04-17 PROCEDURE — 3017F COLORECTAL CA SCREEN DOC REV: CPT | Performed by: FAMILY MEDICINE

## 2023-04-17 PROCEDURE — 3074F SYST BP LT 130 MM HG: CPT | Performed by: FAMILY MEDICINE

## 2023-04-17 RX ORDER — GABAPENTIN 300 MG/1
CAPSULE ORAL
Qty: 90 CAPSULE | Refills: 3 | Status: CANCELLED | OUTPATIENT
Start: 2023-04-17 | End: 2024-04-09

## 2023-04-17 RX ORDER — OLMESARTAN MEDOXOMIL 40 MG/1
40 TABLET ORAL DAILY
Qty: 90 TABLET | Refills: 3 | Status: SHIPPED | OUTPATIENT
Start: 2023-04-17

## 2023-04-17 SDOH — ECONOMIC STABILITY: FOOD INSECURITY: WITHIN THE PAST 12 MONTHS, THE FOOD YOU BOUGHT JUST DIDN'T LAST AND YOU DIDN'T HAVE MONEY TO GET MORE.: NEVER TRUE

## 2023-04-17 SDOH — ECONOMIC STABILITY: FOOD INSECURITY: WITHIN THE PAST 12 MONTHS, YOU WORRIED THAT YOUR FOOD WOULD RUN OUT BEFORE YOU GOT MONEY TO BUY MORE.: NEVER TRUE

## 2023-04-17 SDOH — ECONOMIC STABILITY: INCOME INSECURITY: HOW HARD IS IT FOR YOU TO PAY FOR THE VERY BASICS LIKE FOOD, HOUSING, MEDICAL CARE, AND HEATING?: NOT HARD AT ALL

## 2023-04-17 SDOH — ECONOMIC STABILITY: HOUSING INSECURITY
IN THE LAST 12 MONTHS, WAS THERE A TIME WHEN YOU DID NOT HAVE A STEADY PLACE TO SLEEP OR SLEPT IN A SHELTER (INCLUDING NOW)?: NO

## 2023-04-17 ASSESSMENT — LIFESTYLE VARIABLES
HOW MANY STANDARD DRINKS CONTAINING ALCOHOL DO YOU HAVE ON A TYPICAL DAY: PATIENT DOES NOT DRINK
HOW OFTEN DO YOU HAVE A DRINK CONTAINING ALCOHOL: NEVER

## 2023-04-17 NOTE — PROGRESS NOTES
Medicare Annual Wellness Visit    Ibrahima Diaz is here for Diabetes    Assessment & Plan   Medicare annual wellness visit, subsequent  Type 2 diabetes mellitus with diabetic polyneuropathy, without long-term current use of insulin ([facility] [address]  Essential hypertension, benign  -     olmesartan (BENICAR) 40 MG tablet; Take 1 tablet by mouth daily, Disp-90 tablet, R-3Normal  Type 2 diabetes mellitus without complication, without long-term current use of insulin (HCC)  Exudative age-related macular degeneration of right eye with active choroidal neovascularization ([address]  Mixed diabetic hyperlipidemia associated with type 2 diabetes mellitus ([facility] [address]  Obesity, Class I, BMI 30.0-34.9 (see actual BMI)  Screening for malignant neoplasm of colon  -     External Referral To Gastroenterology  Atypical nevi  -     External Referral to Dermatology  Pain of finger of left hand  -     [address] [place] [address], [address] [place] [address]      Recommendations for [place] Due: see orders and patient instructions/AVS.  Recommended screening schedule for the next 5-10 years is provided to the patient in written form: see Patient Instructions/AVS.     Return in about 1 year (around 4/17/2024). Subjective   The following acute and/or chronic problems were also addressed today:  Patient does see endocrinology about her diabetes and it has been well controlled. She takes gabapentin for neuropathy and Trulicity for diabetes. Her A1c is good. Her blood pressure is well controlled on gabapentin. She has been getting treatment for macular degeneration. She would like to see a dermatologist.    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 She fell and jammed her pinky finger against the ground and since then she has not been able to straighten it. Refer for possible tear of her left fifth finger.   She will

## 2023-04-27 ENCOUNTER — OFFICE VISIT (OUTPATIENT)
Dept: ORTHOPEDIC SURGERY | Age: 73
End: 2023-04-27

## 2023-04-27 VITALS — WEIGHT: 160 LBS | HEIGHT: 64 IN | BODY MASS INDEX: 27.31 KG/M2

## 2023-04-27 DIAGNOSIS — M20.012 MALLET FINGER OF LEFT FINGER(S): ICD-10-CM

## 2023-04-27 DIAGNOSIS — M79.645 FINGER PAIN, LEFT: Primary | ICD-10-CM

## 2023-04-27 NOTE — PROGRESS NOTES
Orthopaedic Hand Clinic Note    Name: Sorin Michaels  YOB: 1950  Gender: female  MRN: [id number]      CC: Patient referred for evaluation of upper extremity pain    HPI: Sorin Michaels is a 67 y.o. female with a chief complaint of injury to the left small finger which occurred at work approximately 3 weeks ago. She was carrying a box and tripped over a hose, landing on the left hand. Since then she has been unable to straighten the finger. She has not had any treatment for this to date. She has been working full duty since this time of the injury      ROS/Meds/PSH/PMH/FH/SH: I personally reviewed the patients standard intake form. Pertinents are discussed in the HPI    Physical Examination:    Musculoskeletal Exam:  Examination on the left upper extremity demonstrates cap refill < 5 seconds in all fingers, skin is intact. There is mild ecchymosis and tenderness in the palm of the hand. There is a 60 degree extension lag at the small finger DIP. She is able to fire FDS and FDP. Light touch sensation is intact throughout. Imaging / Electrodiagnostic Tests:     Finger XR: AP, Lateral, Oblique views     Clinical Indication:  1. Finger pain, left    2. Mallet finger of left finger(s)           Report: AP, lateral, oblique x-ray of the left small finger demonstrates no fracture or dislocation    Impression: as above     Alphonso Cordova MD         Assessment:     ICD-10-CM    1. Finger pain, left  M79.645 XR FINGER LEFT (MIN 2 VIEWS)     Ambulatory referral to Occupational Therapy      2. Mallet finger of left finger(s)  M20.012 Ambulatory referral to Occupational Therapy          Plan:   We discussed the diagnosis and different treatment options. I discussed that her mallet finger injury was a result of her fall, and is not related to her not using ice or a splint. We discussed observation, therapy, antiinflammatory medications and other pertinent treatment modalities.      After discussing

## 2023-05-01 ENCOUNTER — EVALUATION (OUTPATIENT)
Age: 73
End: 2023-05-01

## 2023-05-01 DIAGNOSIS — M20.012 MALLET FINGER OF LEFT FINGER(S): ICD-10-CM

## 2023-05-01 DIAGNOSIS — M79.645 FINGER PAIN, LEFT: ICD-10-CM

## 2023-05-01 DIAGNOSIS — M25.442 EFFUSION, LEFT HAND: Primary | ICD-10-CM

## 2023-05-01 DIAGNOSIS — M79.89 SWELLING OF FINGER, LEFT: ICD-10-CM

## 2023-05-01 NOTE — PROGRESS NOTES
[address] [address]  Dept: [phone]      Occupational Therapy Initial Assessment and Orthosis Note      Referring MD: Karolina Flor MD    Diagnosis:    Diagnosis Orders   1. Effusion, left hand        2. Swelling of finger, left        3. Finger pain, left             Therapy precautions:  mallet precautions    Total Direct Treatment Time: 10 min                       Total In Office Time: 40 min   Modifier needed: No  Episode visit count:  Visit count could not be calculated. Make sure you are using a visit which is associated with an episode. Preferred Name:  [address] HISTORY     PMHX & Meds:   Past Medical History:   Diagnosis Date    Diabetes ([facility] [address]     Hypertension     controlled with med-denies SOB with 1 flight step    Menopause     Mixed diabetic hyperlipidemia associated with type 2 diabetes mellitus ([facility] [address] 8/24/2020    Tubulovillous adenoma of colon 4/28/2017    Type 2 diabetes mellitus with diabetic polyneuropathy, without long-term current use of insulin ([facility] [address] 06/22/2017    type 2; does not check glucose- last A1C= 8.9 on 2/15/17    Type II or unspecified type diabetes mellitus without mention of complication, not stated as uncontrolled 08/05/2015    [id number] symptoms-60s   ,   Past Surgical History:   Procedure Laterality Date    CATARACT REMOVAL Bilateral 2021    CHOLECYSTECTOMY      2012    COLONOSCOPY N/A 4/20/2017    COLONOSCOPY  BMI 29 performed by Armand Hopkins MD at [facility] ENDOSCOPY    COLONOSCOPY N/A 6/22/2017    COLONOSCOPY/ 30 performed by [facility] MD at [facility]  2017    HERNIA REPAIR        Medications. : Reviewed in chart  Allergies:    Allergies   Allergen Reactions    Farxiga [Dapagliflozin] Shortness Of Breath    Lisinopril Other (See Comments)    Sitagliptin Rash        SUBJECTIVE       History of injury/onset including previous

## 2023-05-08 ENCOUNTER — TREATMENT (OUTPATIENT)
Age: 73
End: 2023-05-08
Payer: COMMERCIAL

## 2023-05-08 DIAGNOSIS — M20.012 MALLET FINGER OF LEFT FINGER(S): ICD-10-CM

## 2023-05-08 DIAGNOSIS — M79.645 FINGER PAIN, LEFT: ICD-10-CM

## 2023-05-08 DIAGNOSIS — M79.89 SWELLING OF FINGER, LEFT: ICD-10-CM

## 2023-05-08 DIAGNOSIS — M25.442 EFFUSION, LEFT HAND: Primary | ICD-10-CM

## 2023-05-08 PROCEDURE — 97760 ORTHOTIC MGMT&TRAING 1ST ENC: CPT | Performed by: OCCUPATIONAL THERAPIST

## 2023-05-10 ENCOUNTER — OFFICE VISIT (OUTPATIENT)
Dept: FAMILY MEDICINE CLINIC | Facility: CLINIC | Age: 73
End: 2023-05-10
Payer: COMMERCIAL

## 2023-05-10 VITALS
WEIGHT: 175 LBS | BODY MASS INDEX: 29.88 KG/M2 | RESPIRATION RATE: 18 BRPM | DIASTOLIC BLOOD PRESSURE: 60 MMHG | TEMPERATURE: 98.1 F | HEIGHT: 64 IN | SYSTOLIC BLOOD PRESSURE: 144 MMHG | OXYGEN SATURATION: 96 % | HEART RATE: 71 BPM

## 2023-05-10 DIAGNOSIS — J02.9 SORE THROAT: Primary | ICD-10-CM

## 2023-05-10 DIAGNOSIS — J01.00 ACUTE MAXILLARY SINUSITIS, RECURRENCE NOT SPECIFIED: ICD-10-CM

## 2023-05-10 LAB
EXP DATE SOLUTION: NORMAL
EXP DATE SWAB: NORMAL
EXPIRATION DATE: NORMAL
GROUP A STREP ANTIGEN, POC: NEGATIVE
LOT NUMBER POC: NORMAL
LOT NUMBER SOLUTION: NORMAL
LOT NUMBER SWAB: NORMAL
SARS-COV-2 RNA, POC: NEGATIVE
VALID INTERNAL CONTROL, POC: YES

## 2023-05-10 PROCEDURE — 3077F SYST BP >= 140 MM HG: CPT | Performed by: PHYSICIAN ASSISTANT

## 2023-05-10 PROCEDURE — 3078F DIAST BP <80 MM HG: CPT | Performed by: PHYSICIAN ASSISTANT

## 2023-05-10 PROCEDURE — 99213 OFFICE O/P EST LOW 20 MIN: CPT | Performed by: PHYSICIAN ASSISTANT

## 2023-05-10 PROCEDURE — 1036F TOBACCO NON-USER: CPT | Performed by: PHYSICIAN ASSISTANT

## 2023-05-10 PROCEDURE — G8417 CALC BMI ABV UP PARAM F/U: HCPCS | Performed by: PHYSICIAN ASSISTANT

## 2023-05-10 PROCEDURE — 3017F COLORECTAL CA SCREEN DOC REV: CPT | Performed by: PHYSICIAN ASSISTANT

## 2023-05-10 PROCEDURE — G8399 PT W/DXA RESULTS DOCUMENT: HCPCS | Performed by: PHYSICIAN ASSISTANT

## 2023-05-10 PROCEDURE — 87880 STREP A ASSAY W/OPTIC: CPT | Performed by: PHYSICIAN ASSISTANT

## 2023-05-10 PROCEDURE — 1090F PRES/ABSN URINE INCON ASSESS: CPT | Performed by: PHYSICIAN ASSISTANT

## 2023-05-10 PROCEDURE — 1123F ACP DISCUSS/DSCN MKR DOCD: CPT | Performed by: PHYSICIAN ASSISTANT

## 2023-05-10 PROCEDURE — 87635 SARS-COV-2 COVID-19 AMP PRB: CPT | Performed by: PHYSICIAN ASSISTANT

## 2023-05-10 PROCEDURE — G8427 DOCREV CUR MEDS BY ELIG CLIN: HCPCS | Performed by: PHYSICIAN ASSISTANT

## 2023-05-10 RX ORDER — AMOXICILLIN AND CLAVULANATE POTASSIUM 875; 125 MG/1; MG/1
1 TABLET, FILM COATED ORAL 2 TIMES DAILY
Qty: 20 TABLET | Refills: 0 | Status: SHIPPED | OUTPATIENT
Start: 2023-05-10 | End: 2023-05-20

## 2023-05-10 SDOH — ECONOMIC STABILITY: FOOD INSECURITY: WITHIN THE PAST 12 MONTHS, YOU WORRIED THAT YOUR FOOD WOULD RUN OUT BEFORE YOU GOT MONEY TO BUY MORE.: NEVER TRUE

## 2023-05-10 SDOH — ECONOMIC STABILITY: INCOME INSECURITY: HOW HARD IS IT FOR YOU TO PAY FOR THE VERY BASICS LIKE FOOD, HOUSING, MEDICAL CARE, AND HEATING?: NOT HARD AT ALL

## 2023-05-10 SDOH — ECONOMIC STABILITY: FOOD INSECURITY: WITHIN THE PAST 12 MONTHS, THE FOOD YOU BOUGHT JUST DIDN'T LAST AND YOU DIDN'T HAVE MONEY TO GET MORE.: NEVER TRUE

## 2023-05-10 ASSESSMENT — ENCOUNTER SYMPTOMS
TROUBLE SWALLOWING: 0
EYE DISCHARGE: 0
CHEST TIGHTNESS: 0
SHORTNESS OF BREATH: 0
SINUS PAIN: 1
WHEEZING: 0
RHINORRHEA: 1
SORE THROAT: 1
COUGH: 1
SINUS PRESSURE: 1

## 2023-05-10 ASSESSMENT — PATIENT HEALTH QUESTIONNAIRE - PHQ9
SUM OF ALL RESPONSES TO PHQ QUESTIONS 1-9: 0
SUM OF ALL RESPONSES TO PHQ QUESTIONS 1-9: 0
2. FEELING DOWN, DEPRESSED OR HOPELESS: 0
SUM OF ALL RESPONSES TO PHQ QUESTIONS 1-9: 0
SUM OF ALL RESPONSES TO PHQ QUESTIONS 1-9: 0
SUM OF ALL RESPONSES TO PHQ9 QUESTIONS 1 & 2: 0
1. LITTLE INTEREST OR PLEASURE IN DOING THINGS: 0

## 2023-05-10 NOTE — PROGRESS NOTES
[facility] [address]  [address]  Phone [phone]      Patient: Liss Garcia  YOB: 1950  Age 67 y.o. Sex female  Medical Record:  [id number]  Visit Date: 05/10/23  Author:  Mark Anthony Park PA-C    Family Practice Clinic Note    Chief Complaint   Patient presents with    Pharyngitis     With congestion, ear fullness, coughing up phlegm, patient is having difficulty sleeping at night due to cough, has not taken a COVID test        History of Present Illness  This is a 79-year-old female who presents today with complaints of respiratory symptoms which have been present now for 3-4 days. She has been experiencing sore throat and fullness of both of her ears. She has had sinus congestion with increased sinus pressure and pain. She notes some chest congestion and cough. Cough is occasionally productive. She denies any documented fever or chills. No myalgias.       Past History:    Past Medical history   Past Medical History:   Diagnosis Date    Diabetes ([address]     Hypertension     controlled with med-denies SOB with 1 flight step    Menopause     Mixed diabetic hyperlipidemia associated with type 2 diabetes mellitus ([address] 8/24/2020    Tubulovillous adenoma of colon 4/28/2017    Type 2 diabetes mellitus with diabetic polyneuropathy, without long-term current use of insulin ([address] 06/22/2017    type 2; does not check glucose- last A1C= 8.9 on 2/15/17    Type II or unspecified type diabetes mellitus without mention of complication, not stated as uncontrolled 08/05/2015    [id number] symptoms-60s       Current Problem List:   Patient Active Problem List   Diagnosis    Incisional hernia with obstruction but no gangrene    Tubulovillous adenoma of colon    Essential hypertension, benign    Type II diabetes mellitus ([address]    Type 2 diabetes mellitus with diabetic polyneuropathy, without long-term current use of insulin (HCC)    Exudative age-related macular

## 2023-05-11 ENCOUNTER — OFFICE VISIT (OUTPATIENT)
Dept: ORTHOPEDIC SURGERY | Age: 73
End: 2023-05-11
Payer: COMMERCIAL

## 2023-05-11 DIAGNOSIS — M20.012 MALLET FINGER OF LEFT FINGER(S): Primary | ICD-10-CM

## 2023-05-11 PROCEDURE — G8417 CALC BMI ABV UP PARAM F/U: HCPCS | Performed by: ORTHOPAEDIC SURGERY

## 2023-05-11 PROCEDURE — 1090F PRES/ABSN URINE INCON ASSESS: CPT | Performed by: ORTHOPAEDIC SURGERY

## 2023-05-11 PROCEDURE — 1036F TOBACCO NON-USER: CPT | Performed by: ORTHOPAEDIC SURGERY

## 2023-05-11 PROCEDURE — G8427 DOCREV CUR MEDS BY ELIG CLIN: HCPCS | Performed by: ORTHOPAEDIC SURGERY

## 2023-05-11 PROCEDURE — 1123F ACP DISCUSS/DSCN MKR DOCD: CPT | Performed by: ORTHOPAEDIC SURGERY

## 2023-05-11 PROCEDURE — 3017F COLORECTAL CA SCREEN DOC REV: CPT | Performed by: ORTHOPAEDIC SURGERY

## 2023-05-11 PROCEDURE — G8399 PT W/DXA RESULTS DOCUMENT: HCPCS | Performed by: ORTHOPAEDIC SURGERY

## 2023-05-11 PROCEDURE — 99213 OFFICE O/P EST LOW 20 MIN: CPT | Performed by: ORTHOPAEDIC SURGERY

## 2023-05-11 NOTE — PROGRESS NOTES
needed    Patient voiced accordance and understanding of the information provided and the formulated plan. All questions were answered to the patient's satisfaction during the encounter.       Luis Fernando Garcia MD  Orthopaedic Surgery  05/11/23  4:41 PM

## 2023-05-15 ENCOUNTER — TREATMENT (OUTPATIENT)
Age: 73
End: 2023-05-15
Payer: COMMERCIAL

## 2023-05-15 DIAGNOSIS — M79.645 FINGER PAIN, LEFT: ICD-10-CM

## 2023-05-15 DIAGNOSIS — M25.442 EFFUSION, LEFT HAND: Primary | ICD-10-CM

## 2023-05-15 DIAGNOSIS — M20.012 MALLET FINGER OF LEFT FINGER(S): ICD-10-CM

## 2023-05-15 DIAGNOSIS — M79.89 SWELLING OF FINGER, LEFT: ICD-10-CM

## 2023-05-15 PROCEDURE — 97763 ORTHC/PROSTC MGMT SBSQ ENC: CPT | Performed by: OCCUPATIONAL THERAPIST

## 2023-06-09 ENCOUNTER — TREATMENT (OUTPATIENT)
Age: 73
End: 2023-06-09

## 2023-06-09 DIAGNOSIS — M79.89 SWELLING OF FINGER, LEFT: ICD-10-CM

## 2023-06-09 DIAGNOSIS — M20.012 MALLET FINGER OF LEFT FINGER(S): ICD-10-CM

## 2023-06-09 DIAGNOSIS — M79.645 FINGER PAIN, LEFT: ICD-10-CM

## 2023-06-09 DIAGNOSIS — M25.442 EFFUSION, LEFT HAND: Primary | ICD-10-CM

## 2023-06-09 NOTE — PROGRESS NOTES
have a lag but it was not measured. Appeared to be about 15 deg. Pt instructed to continue orthosis use at all times. She has an alternate splint now for showering. PLAN OF CARE     Return June 22 to see MD and therapy. Slowly wean from splint as indicated and begin AROM/strengthening     GOALS     Short term goals:  (4 weeks, 5/29/2023  (4 weeks))  Patient will be I with HEP and precautions. Patient to demonstrate independence with donning/doffing orthosis in one visit, Patient to verbalize understanding of inspecting skin to prevent pressure areas and allergic reaction due to orthosis, Patient to verbalize understanding of precautions and necessity of wearing orthosis as indicated by therapist, and Patient to demonstrate independence with HEP in one visit  The patient will have full MP flexion and extension of the affected digit in order to maximize functional use of hand with ADL's. The patient will have full PIP extension and flexion of the affected digit in order to maximize functional use of hand with ADL's. The patient will maintain precautions of full DIP extension in affected digit at all times for 6 weeks. Improve Quick DASH functional assessment score from less than 20% deficit. Long term goals: (12 weeks, 7/24/2023  (12 weeks) )  Pt will be able to use the affected UE for all ADL's without difficulty. Increase active digit flexion of DIP joint in affected digit to 45° to improve ability to grasp. Prevent DIP extension lag in affected digit no greater than 15°    Decrease edema in affected digit to minimal to improve motion. Pt will have adequate strength to be able to hold and open containers without difficulty. Pt will be able to make a full composite fist with the involved hand to enable to grasp and hold objects. Improve Quick DASH functional assessment score from less than 20% deficit.       [facility] Portal     OT Protocols 83 year old c/o right arm weakness and slurred speech that has resolved.  SOB that has also resolved.  CXR with no acute pulmonary pathology.  Labs within normal limits and no neurological deficits.  CT negative for acute pathology.  Patient  placed in observation for rule out TIA. Home

## 2023-06-22 ENCOUNTER — OFFICE VISIT (OUTPATIENT)
Dept: ORTHOPEDIC SURGERY | Age: 73
End: 2023-06-22

## 2023-06-22 ENCOUNTER — TREATMENT (OUTPATIENT)
Age: 73
End: 2023-06-22

## 2023-06-22 DIAGNOSIS — Z78.9 DECREASED ACTIVITIES OF DAILY LIVING (ADL): ICD-10-CM

## 2023-06-22 DIAGNOSIS — M20.012 MALLET FINGER OF LEFT FINGER(S): Primary | ICD-10-CM

## 2023-06-22 DIAGNOSIS — M25.60 DECREASED RANGE OF MOTION: ICD-10-CM

## 2023-06-22 NOTE — PROGRESS NOTES
demonstrate independence with HEP in one visit (met but not compliant)  The patient will have full MP flexion and extension of the affected digit in order to maximize functional use of hand with ADL's.(MET)  The patient will have full PIP extension and flexion of the affected digit in order to maximize functional use of hand with ADL's. (Partially met; patient with full PIP ext; limited PIP flexion)  The patient will maintain precautions of full DIP extension in affected digit at all times for 6 weeks. (Not met)  Improve Quick DASH functional assessment score from less than 20% deficit. (Not met)    Long term goals: (12 weeks, 7/24/2023  (12 weeks) )  Pt will be able to use the affected UE for all ADL's without difficulty. Increase active digit flexion of DIP joint in affected digit to 45° to improve ability to grasp. Prevent DIP extension lag in affected digit no greater than 15°    Decrease edema in affected digit to minimal to improve motion. Pt will have adequate strength to be able to hold and open containers without difficulty. Pt will be able to make a full composite fist with the involved hand to enable to grasp and hold objects. Improve Quick DASH functional assessment score from less than 20% deficit. [email or web address] Portal   Access Code: [email or web address]  URL: [email or web address]  Date: 06/22/2023  Prepared by: Chandni Bocanegra    Exercises  - Half Fist Active Flexion  - 3 x daily - 7 x weekly - 2 sets - 10 reps    Access Code: [email or web address]  URL: [email or web address]  Date: 06/22/2023  Prepared by: Chandni Bocanegra    Exercises  - Half Fist Active Flexion  - 3 x daily - 7 x weekly - 2 sets - 10 reps  OT Protocols

## 2023-06-22 NOTE — PROGRESS NOTES
Orthopaedic Hand Clinic Note    Name: Wayne Hughes  YOB: 1950  Gender: female  MRN: [id number]      Follow up visit:   1. Mallet finger of left finger(s)        HPI: Wayne Hughes is a 67 y.o. female who is following up for left small finger mallet injury. Based on my discussion with the therapists that have seen her she has been fairly noncompliant with use of her splint. .      ROS/Meds/PSH/PMH/FH/SH: I personally reviewed the patients standard intake form. Pertinents are discussed in the HPI    Physical Examination:    Musculoskeletal Examination:  Examination on the left upper extremity demonstrates cap refill < 5 seconds in all fingers, there is a 15-20 degree extension lag of the small finger distal interphalangeal joint. Light touch sensation is intact throughout. .    Imaging / Electrodiagnostic Tests:     Finger XR: AP, Lateral, Oblique views     Clinical Indication:  1. Mallet finger of left finger(s)           Report: AP, lateral, oblique x-ray of the left small finger demonstrates no fracture or dislocation    Impression: as above     Hipolito Narvaez MD         Assessment:     ICD-10-CM    1. Mallet finger of left finger(s)  M20.012 XR FINGER LEFT (MIN 2 VIEWS)          Plan:   We discussed the diagnosis and different treatment options. We discussed observation, therapy, antiinflammatory medications and other pertinent treatment modalities. He is now 11 weeks since the time of the patient's injury. The patient has been fairly noncompliant with use of her splint based on discussion with multiple therapist to have seen her. I explained that because of this she will have residual extension lag. I explained that this tends to be a cosmetic problem not a functional 1. She will begin weaning out of the splint over the next 2 weeks, but should keep it in place for most activities throughout the day as well as at nighttime.   She can remove the splint briefly to perform active

## 2023-06-29 ENCOUNTER — TREATMENT (OUTPATIENT)
Age: 73
End: 2023-06-29

## 2023-06-29 DIAGNOSIS — M25.442 EFFUSION, LEFT HAND: ICD-10-CM

## 2023-06-29 DIAGNOSIS — M79.89 SWELLING OF FINGER, LEFT: ICD-10-CM

## 2023-06-29 DIAGNOSIS — Z78.9 DECREASED ACTIVITIES OF DAILY LIVING (ADL): ICD-10-CM

## 2023-06-29 DIAGNOSIS — M79.645 FINGER PAIN, LEFT: ICD-10-CM

## 2023-06-29 DIAGNOSIS — M25.60 DECREASED RANGE OF MOTION: Primary | ICD-10-CM

## 2023-07-20 ENCOUNTER — OFFICE VISIT (OUTPATIENT)
Dept: ORTHOPEDIC SURGERY | Age: 73
End: 2023-07-20

## 2023-07-20 ENCOUNTER — TREATMENT (OUTPATIENT)
Age: 73
End: 2023-07-20

## 2023-07-20 DIAGNOSIS — M20.012 MALLET FINGER OF LEFT FINGER(S): Primary | ICD-10-CM

## 2023-07-20 DIAGNOSIS — Z78.9 DECREASED ACTIVITIES OF DAILY LIVING (ADL): ICD-10-CM

## 2023-07-20 DIAGNOSIS — M25.60 DECREASED RANGE OF MOTION: ICD-10-CM

## 2023-07-20 NOTE — PROGRESS NOTES
[address] [place] [address] [address] [facility]  [address] [facility]  Dept: [phone]      Occupational Therapy Daily Note     Referring MD: Miya Bedoya MD    Diagnosis:    Diagnosis Orders   1. Mallet finger of left finger(s) [M20.012 (ICD-10-CM)]        2. Decreased range of motion        3. Decreased activities of daily living (ADL)                 Therapy precautions:  mallet precautions    Total Direct Treatment Time: 15 min                       Total In Office Time: 30 min   Modifier needed: No    Episode visit count:  7 of 6    1 of 4 visits added (between 6/27/23-7/25/23)      Preferred Name:  Hallie Angle HISTORY     PMHX & Meds:   Past Medical History:   Diagnosis Date    Diabetes ([address])     Hypertension     controlled with med-denies SOB with 1 flight step    Menopause     Mixed diabetic hyperlipidemia associated with type 2 diabetes mellitus ([address]) 8/24/2020    Tubulovillous adenoma of colon 4/28/2017    Type 2 diabetes mellitus with diabetic polyneuropathy, without long-term current use of insulin ([address]) 06/22/2017    type 2; does not check glucose- last A1C= 8.9 on 2/15/17    Type II or unspecified type diabetes mellitus without mention of complication, not stated as uncontrolled 08/05/2015    [id number] symptoms-60s   ,   Past Surgical History:   Procedure Laterality Date    CATARACT REMOVAL Bilateral 2021    CHOLECYSTECTOMY      2012    COLONOSCOPY N/A 4/20/2017    COLONOSCOPY  BMI 29 performed by Darnell Olivia MD at [facility] ENDOSCOPY    COLONOSCOPY N/A 6/22/2017    COLONOSCOPY/ 30 performed by Darnell Olivia MD at [address] [place] [address]  2017    HERNIA REPAIR        Medications. : Reviewed in chart  Allergies:    Allergies   Allergen Reactions    Farxiga [Dapagliflozin] Shortness Of Breath    Lisinopril Other (See Comments)    Sitagliptin Rash        SUBJECTIVE       History of injury/onset including previous

## 2023-07-20 NOTE — PROGRESS NOTES
Orthopaedic Hand Clinic Note    Name: Mitch Strange  YOB: 1950  Gender: female  MRN: [id number]      Follow up visit:   1. Mallet finger of left finger(s)        HPI: Mitch Strange is a 67 y.o. female who is following up for left small finger mallet injury. Based on my discussion with the therapists that have seen her she has been fairly noncompliant with use of her splint. .      ROS/Meds/PSH/PMH/FH/SH: I personally reviewed the patients standard intake form. Pertinents are discussed in the HPI    Physical Examination:    Musculoskeletal Examination:  Examination on the left upper extremity demonstrates cap refill < 5 seconds in all fingers, there is a 20 degree extension lag of the small finger distal interphalangeal joint. Light touch sensation is intact throughout. .    Imaging / Electrodiagnostic Tests:     Finger XR: AP, Lateral, Oblique views     Clinical Indication:  1. Mallet finger of left finger(s)           Report: AP, lateral, oblique x-ray of the left small finger demonstrates no fracture or dislocation    Impression: as above     Payal Martínez MD         Assessment:     ICD-10-CM    1. Mallet finger of left finger(s)  M20.012           Plan:   We discussed the diagnosis and different treatment options. We discussed observation, therapy, antiinflammatory medications and other pertinent treatment modalities. It is now 15 weeks since the time of the patient's injury. The patient has been fairly noncompliant with use of her splint based on discussion with multiple therapists who have seen her. I explained that because of this she will have residual extension lag. She can discontinue the use of her splint a this time. I have no activity restrictions for her. She will follow up as needed    Patient voiced accordance and understanding of the information provided and the formulated plan.  All questions were answered to the patient's satisfaction during the

## 2023-07-25 ENCOUNTER — TREATMENT (OUTPATIENT)
Age: 73
End: 2023-07-25

## 2023-07-25 DIAGNOSIS — M25.60 DECREASED RANGE OF MOTION: ICD-10-CM

## 2023-07-25 DIAGNOSIS — Z78.9 DECREASED ACTIVITIES OF DAILY LIVING (ADL): ICD-10-CM

## 2023-07-25 DIAGNOSIS — M20.012 MALLET FINGER OF LEFT FINGER(S): Primary | ICD-10-CM

## 2023-07-28 ENCOUNTER — TELEPHONE (OUTPATIENT)
Dept: ORTHOPEDIC SURGERY | Age: 73
End: 2023-07-28

## 2023-07-31 ENCOUNTER — TELEPHONE (OUTPATIENT)
Dept: ORTHOPEDIC SURGERY | Age: 73
End: 2023-07-31

## 2023-07-31 NOTE — TELEPHONE ENCOUNTER
I lvm for Ms Sivakumar De Leon letting her know that I had left her a voice message last week with an appointment day/time for her to come into the office to discuss surgical options with Dr. Kasia Roa. Once they discuss sx, we can give her a sx date.

## 2023-08-10 ENCOUNTER — OFFICE VISIT (OUTPATIENT)
Dept: ORTHOPEDIC SURGERY | Age: 73
End: 2023-08-10

## 2023-08-10 DIAGNOSIS — M20.012 MALLET FINGER OF LEFT FINGER(S): Primary | ICD-10-CM

## 2023-08-10 NOTE — PROGRESS NOTES
that the would permanently eliminate any motion at the DIP joint. She understands and wishes to proceed. Patient understands risks and benefits of left small finger distal interphalangeal joint arthrodesis including but not limited to nerve injury, vessel injury, infection, continued pain, nonunion, failure to achieve desired results and possible need for additional surgery. Patient understands and wishes to proceed with surgery. On Exam:   The patient is alert and oriented  Cardiovascular: regular rate and rhythm  Respiratory: Non labored breathing     Patient voiced accordance and understanding of the information provided and the formulated plan. All questions were answered to the patient's satisfaction during the encounter.       Sea Esparza MD  Orthopaedic Surgery  08/10/23  7:00 PM

## 2023-08-16 DIAGNOSIS — M20.012 MALLET FINGER OF LEFT FINGER(S): Primary | ICD-10-CM

## 2023-09-06 ENCOUNTER — TELEPHONE (OUTPATIENT)
Dept: ORTHOPEDIC SURGERY | Age: 73
End: 2023-09-06

## 2023-09-06 NOTE — TELEPHONE ENCOUNTER
I spoke to Ms. Wylie to get her scheduled for sx; however, in speaking to her, she mentioned Workman's Comp. I didn't realize she was Wcomp so I explained to her that I needed to get authorization from them before getting her a surgery date. She stated that she received a letter authorizing it but there is nothing in her chart that I can see. I told her I will request the authorization and in the meantime, she could tell her contact that they need to contact our office. I gave her Huey's name and e-mail address. Once we get the approval, we will call her to get is scheduled. She voiced understanding.

## 2023-09-07 NOTE — TELEPHONE ENCOUNTER
Estephania Morton in the [facility] Dept has sent an e-mail to the  to check on the status of the request for sx.

## 2023-09-11 ENCOUNTER — TELEPHONE (OUTPATIENT)
Dept: ORTHOPEDIC SURGERY | Age: 73
End: 2023-09-11

## 2023-09-12 NOTE — TELEPHONE ENCOUNTER
I called and spoke with patient, advised her we have reached out to her work comp about her surgery authorization. Advised her we were told that her  is on long term leave. Patient states she received a phon call form work comp yesterday telling her to bring us a copy of the authorization for surgery letter which she received. I advised her to bring it to the [facility] office and to tell the  it needs to go to [place]. Advised once I make ure that's what we need I'll be happy to call her back and get her surgery scheduled. Patient voiced understanding.

## 2023-09-26 ENCOUNTER — TELEPHONE (OUTPATIENT)
Dept: ORTHOPEDIC SURGERY | Age: 73
End: 2023-09-26

## 2023-09-26 NOTE — TELEPHONE ENCOUNTER
No one from Dr. Gayle Winkler team called MsAlisia Maria E Magen. We are waiting on [facility] auth for sx.

## 2023-09-26 NOTE — TELEPHONE ENCOUNTER
Please call this pt  again  if  you were trying to call her regarding  setting  surgery.   If  you  get  VM please  leave  some  date options on VM     Thank you

## 2023-11-13 ENCOUNTER — TELEPHONE (OUTPATIENT)
Dept: DIABETES SERVICES | Age: 73
End: 2023-11-13

## 2023-11-13 ENCOUNTER — OFFICE VISIT (OUTPATIENT)
Dept: ENDOCRINOLOGY | Age: 73
End: 2023-11-13
Payer: MEDICARE

## 2023-11-13 ENCOUNTER — TELEPHONE (OUTPATIENT)
Dept: ENDOCRINOLOGY | Age: 73
End: 2023-11-13

## 2023-11-13 VITALS
BODY MASS INDEX: 30.45 KG/M2 | OXYGEN SATURATION: 98 % | SYSTOLIC BLOOD PRESSURE: 140 MMHG | DIASTOLIC BLOOD PRESSURE: 76 MMHG | WEIGHT: 177.4 LBS | HEART RATE: 73 BPM

## 2023-11-13 DIAGNOSIS — M79.10 MYALGIA DUE TO STATIN: ICD-10-CM

## 2023-11-13 DIAGNOSIS — E11.42 TYPE 2 DIABETES MELLITUS WITH DIABETIC POLYNEUROPATHY, WITHOUT LONG-TERM CURRENT USE OF INSULIN (HCC): Primary | ICD-10-CM

## 2023-11-13 DIAGNOSIS — T46.6X5A MYALGIA DUE TO STATIN: ICD-10-CM

## 2023-11-13 DIAGNOSIS — E11.69 MIXED DIABETIC HYPERLIPIDEMIA ASSOCIATED WITH TYPE 2 DIABETES MELLITUS (HCC): ICD-10-CM

## 2023-11-13 DIAGNOSIS — E78.2 MIXED DIABETIC HYPERLIPIDEMIA ASSOCIATED WITH TYPE 2 DIABETES MELLITUS (HCC): ICD-10-CM

## 2023-11-13 DIAGNOSIS — I10 ESSENTIAL HYPERTENSION, BENIGN: ICD-10-CM

## 2023-11-13 LAB — HBA1C MFR BLD: 7 %

## 2023-11-13 PROCEDURE — 3078F DIAST BP <80 MM HG: CPT | Performed by: PHYSICIAN ASSISTANT

## 2023-11-13 PROCEDURE — 99214 OFFICE O/P EST MOD 30 MIN: CPT | Performed by: PHYSICIAN ASSISTANT

## 2023-11-13 PROCEDURE — 3077F SYST BP >= 140 MM HG: CPT | Performed by: PHYSICIAN ASSISTANT

## 2023-11-13 PROCEDURE — 1123F ACP DISCUSS/DSCN MKR DOCD: CPT | Performed by: PHYSICIAN ASSISTANT

## 2023-11-13 PROCEDURE — 83036 HEMOGLOBIN GLYCOSYLATED A1C: CPT | Performed by: PHYSICIAN ASSISTANT

## 2023-11-13 RX ORDER — DULAGLUTIDE 4.5 MG/.5ML
4.5 INJECTION, SOLUTION SUBCUTANEOUS WEEKLY
Qty: 12 ADJUSTABLE DOSE PRE-FILLED PEN SYRINGE | Refills: 3 | Status: SHIPPED | OUTPATIENT
Start: 2023-11-13 | End: 2023-11-17 | Stop reason: SDUPTHER

## 2023-11-13 NOTE — TELEPHONE ENCOUNTER
Patient came in for an appointment and filled out [employer] Patient Assistance application for [employer]. Patient will try to send me 2023 Social Security benefits. If not will try to use 2022.     Lyndon Overton gave me prescription

## 2023-11-13 NOTE — PROGRESS NOTES
Cr 0.73, GFR 83                                  03/01/2022      Cr 0.86, GFR 72, microalbumin/Cr ratio 9      09/06/2022 Cr 0.80, GFR >60     Lipids:     Current therapy This patient does not have an active medication from one of the medication groupers.  Suffers from statin myalgias                           02/19/2019  TC- 192, LDL- 98, VLDL- 42,  HDL- 52, TG- 210                           09/16/2019  TC- 178, LDL- 89, VLDL- 36,  HDL- 53, TG- 180                           08/06/2020  TC- 187, LDL- 99, VLDL- 33,  HDL- 55, TG- 167                              09/28/2021  TC- 208, LDL- 90, VLDL- 70,  HDL- 48, TG- 424    03/28/2022  TC- 198, LDL- 110, VLDL- 33,  HDL- 48, TG- 233        Lab Results   Component Value Date    CHOL 175 04/10/2023    CHOL 198 03/28/2022    CHOL 208 (H) 09/28/2021     Lab Results   Component Value Date    LDLCALC 85.4 04/10/2023    LDLCALC 110 (H) 03/28/2022    LDLCALC 90 09/28/2021      Lab Results   Component Value Date    LABVLDL 40.6 (H) 04/10/2023    LABVLDL 33 08/06/2020    LABVLDL 36 09/16/2019    VLDL 40 03/28/2022    VLDL 70 (H) 09/28/2021    VLDL 36 03/26/2021      Lab Results   Component Value Date    HDL 49 04/10/2023    HDL 48 03/28/2022    HDL 48 09/28/2021      Lab Results   Component Value Date    HDL 49 04/10/2023    HDL 48 03/28/2022    HDL 48 09/28/2021      Lab Results   Component Value Date    TRIG 203 (H) 04/10/2023    TRIG 233 (H) 03/28/2022    TRIG 424 (H) 09/28/2021     Lab Results   Component Value Date    CHOLHDLRATIO 3.6 04/10/2023         Hemoglobin A1c:               02/19/2019      10.0%               04/14/2019      11.0%               06/24/2019      6.3%               09/25/2019      6.5%               03/03/2020      6.3%                   08/06/2020      6.5%                       03/26/2021      6.8%                      03/01/2022      7.6%                      09/06/2022      7.8%    01/12/2023 7.0%    11/13/2023 7.0%  Hemoglobin A1C, POC   Date Value

## 2023-11-14 ENCOUNTER — CLINICAL DOCUMENTATION (OUTPATIENT)
Dept: ORTHOPEDIC SURGERY | Age: 73
End: 2023-11-14

## 2023-11-15 NOTE — TELEPHONE ENCOUNTER
11/15/23 Placed [facility] 2024 Re-enrollment application in Shaila's box to be signed.

## 2023-11-17 DIAGNOSIS — E11.42 TYPE 2 DIABETES MELLITUS WITH DIABETIC POLYNEUROPATHY, WITHOUT LONG-TERM CURRENT USE OF INSULIN (HCC): ICD-10-CM

## 2023-11-17 RX ORDER — DULAGLUTIDE 4.5 MG/.5ML
4.5 INJECTION, SOLUTION SUBCUTANEOUS WEEKLY
Qty: 12 ADJUSTABLE DOSE PRE-FILLED PEN SYRINGE | Refills: 3 | Status: SHIPPED | OUTPATIENT
Start: 2023-11-17

## 2023-11-21 ENCOUNTER — FOLLOWUP TELEPHONE ENCOUNTER (OUTPATIENT)
Dept: DIABETES SERVICES | Age: 73
End: 2023-11-21

## 2023-11-21 NOTE — TELEPHONE ENCOUNTER
Second call to pt regarding type 2 diabetes. Pt not interested at this time due to going through workman's comp, to have surgery on her finger and pt wants to call us when she is ready.

## 2023-11-21 NOTE — TELEPHONE ENCOUNTER
Received a letter from [address] [place] [address] stating patient is eligible and is enrollment from 1/1/24 until the end of 2024 calendar year for her Trulicity    Called patient and informed her of the above information. She expressed understanding.

## 2024-01-12 ENCOUNTER — NURSE ONLY (OUTPATIENT)
Dept: FAMILY MEDICINE CLINIC | Facility: CLINIC | Age: 74
End: 2024-01-12

## 2024-01-12 DIAGNOSIS — Z11.1 SCREENING FOR TUBERCULOSIS: Primary | ICD-10-CM

## 2024-01-15 ENCOUNTER — NURSE ONLY (OUTPATIENT)
Dept: FAMILY MEDICINE CLINIC | Facility: CLINIC | Age: 74
End: 2024-01-15

## 2024-01-15 LAB
INDURATION: 0
TB SKIN TEST: NEGATIVE

## 2024-01-15 NOTE — PROGRESS NOTES
Pt here for PPD read. PPD read in the patients left forearm read as negative. Pt given copy of results

## 2024-03-21 ENCOUNTER — CLINICAL DOCUMENTATION (OUTPATIENT)
Dept: ORTHOPEDIC SURGERY | Age: 74
End: 2024-03-21

## 2024-03-21 NOTE — PROGRESS NOTES
The patient no longer wishes to proceed with left small finger distal interphalangeal joint arthrodesis, and wishes to live with the finger as is. She has achieved MMI. I have no activity restrictions for her.

## 2024-03-28 ENCOUNTER — OFFICE VISIT (OUTPATIENT)
Dept: FAMILY MEDICINE CLINIC | Facility: CLINIC | Age: 74
End: 2024-03-28

## 2024-03-28 VITALS
BODY MASS INDEX: 29.33 KG/M2 | TEMPERATURE: 97.8 F | RESPIRATION RATE: 17 BRPM | HEIGHT: 64 IN | DIASTOLIC BLOOD PRESSURE: 78 MMHG | HEART RATE: 80 BPM | WEIGHT: 171.8 LBS | OXYGEN SATURATION: 97 % | SYSTOLIC BLOOD PRESSURE: 154 MMHG

## 2024-03-28 DIAGNOSIS — I10 ESSENTIAL HYPERTENSION, BENIGN: ICD-10-CM

## 2024-03-28 DIAGNOSIS — J01.00 ACUTE MAXILLARY SINUSITIS, RECURRENCE NOT SPECIFIED: Primary | ICD-10-CM

## 2024-03-28 RX ORDER — DOXYCYCLINE HYCLATE 100 MG
100 TABLET ORAL 2 TIMES DAILY
Qty: 20 TABLET | Refills: 0 | Status: SHIPPED | OUTPATIENT
Start: 2024-03-28 | End: 2024-04-07

## 2024-03-28 RX ORDER — FLUTICASONE PROPIONATE 50 MCG
SPRAY, SUSPENSION (ML) NASAL
Qty: 1 EACH | Refills: 0 | Status: SHIPPED | OUTPATIENT
Start: 2024-03-28

## 2024-03-28 ASSESSMENT — PATIENT HEALTH QUESTIONNAIRE - PHQ9
2. FEELING DOWN, DEPRESSED OR HOPELESS: NOT AT ALL
SUM OF ALL RESPONSES TO PHQ QUESTIONS 1-9: 0
1. LITTLE INTEREST OR PLEASURE IN DOING THINGS: NOT AT ALL
SUM OF ALL RESPONSES TO PHQ QUESTIONS 1-9: 0
SUM OF ALL RESPONSES TO PHQ9 QUESTIONS 1 & 2: 0
SUM OF ALL RESPONSES TO PHQ QUESTIONS 1-9: 0
SUM OF ALL RESPONSES TO PHQ QUESTIONS 1-9: 0

## 2024-03-28 ASSESSMENT — ENCOUNTER SYMPTOMS
RHINORRHEA: 1
WHEEZING: 0
SINUS PRESSURE: 1
SINUS PAIN: 1
EYE DISCHARGE: 0
SHORTNESS OF BREATH: 0
CHEST TIGHTNESS: 0
SORE THROAT: 0
COUGH: 1

## 2024-03-28 NOTE — PATIENT INSTRUCTIONS
*Take the antibiotic (Doxycycline) as prescribed. Recommend Tylenol for fever or pain, Mucinex DM, over the counter, for cough and congestion. Flonase - 2 sprays per nostril once a day. If sore throat is present, may mix 1/2 to 1 tsp of table salt in 8oz of warm water and gargle every 1-2 hours. Do not swallow the salt water, it could cause nausea. Make sure to wash hands frequently. Please return if symptoms don't improve or if they worsen.  *Please keep track of your blood pressure. Check it 3-4 days a week. Write down your results and bring them with you to your next office visit for review. We'd like you to stay < 140/90, and ideally < 130/80. Let us know if you find yourself above this routinely.

## 2024-03-28 NOTE — PROGRESS NOTES
Behavior: Behavior normal.         Thought Content: Thought content normal.       ASSESSMENT & PLAN    ICD-10-CM    1. Acute maxillary sinusitis, recurrence not specified  J01.00 doxycycline hyclate (VIBRA-TABS) 100 MG tablet     fluticasone (FLONASE) 50 MCG/ACT nasal spray      2. Essential hypertension, benign  I10            1. Acute maxillary sinusitis, recurrence not specified  *Symptoms and exam findings are suggestive of acute maxillary sinusitis.  Take the antibiotic (Doxycycline) as prescribed. Recommend Tylenol for fever or pain, Mucinex DM, over the counter, for cough and congestion. Flonase - 2 sprays per nostril once a day. If sore throat is present, may mix 1/2 to 1 tsp of table salt in 8oz of warm water and gargle every 1-2 hours. Do not swallow the salt water, it could cause nausea. Make sure to wash hands frequently. Please return if symptoms don't improve or if they worsen.  - doxycycline hyclate (VIBRA-TABS) 100 MG tablet; Take 1 tablet by mouth 2 times daily for 10 days  Dispense: 20 tablet; Refill: 0  - fluticasone (FLONASE) 50 MCG/ACT nasal spray; 2 sprays in each nostril once a day.  Dispense: 1 each; Refill: 0    2. Essential hypertension, benign  *Blood pressure is elevated today but patient reports that she has not taken her blood pressure medication today.  To make sure she takes olmesartan 40 mg when she gets home today.  I have asked her to monitor her blood pressure at home as well.  To check this 3 to 4 days weekly, record readings and bring these with her to next visit for review.  Call if consistently greater than 140/90.  She does have a previously scheduled follow-up here in about 3 weeks.  Will recheck at that time.      No orders of the defined types were placed in this encounter.    I have reviewed the patient's past medical history, social history and family history and vitals.  We have discussed treatment plan and follow up and given patient instructions.  Patient's

## 2024-04-09 ENCOUNTER — TELEPHONE (OUTPATIENT)
Dept: FAMILY MEDICINE CLINIC | Facility: CLINIC | Age: 74
End: 2024-04-09

## 2024-04-09 ENCOUNTER — TELEPHONE (OUTPATIENT)
Dept: ORTHOPEDIC SURGERY | Age: 74
End: 2024-04-09

## 2024-04-09 DIAGNOSIS — E11.69 MIXED DIABETIC HYPERLIPIDEMIA ASSOCIATED WITH TYPE 2 DIABETES MELLITUS (HCC): ICD-10-CM

## 2024-04-09 DIAGNOSIS — E78.2 MIXED DIABETIC HYPERLIPIDEMIA ASSOCIATED WITH TYPE 2 DIABETES MELLITUS (HCC): ICD-10-CM

## 2024-04-09 DIAGNOSIS — I10 ESSENTIAL HYPERTENSION, BENIGN: Primary | ICD-10-CM

## 2024-04-09 DIAGNOSIS — E11.42 TYPE 2 DIABETES MELLITUS WITH DIABETIC POLYNEUROPATHY, WITHOUT LONG-TERM CURRENT USE OF INSULIN (HCC): ICD-10-CM

## 2024-04-09 NOTE — TELEPHONE ENCOUNTER
They issued a check on March 29 for the 14B. She needs the status of receiving the 14B. Her fax # [phone]. Please let her. She also sent a fax on Friday.

## 2024-04-10 ENCOUNTER — NURSE ONLY (OUTPATIENT)
Dept: FAMILY MEDICINE CLINIC | Facility: CLINIC | Age: 74
End: 2024-04-10

## 2024-04-10 DIAGNOSIS — I10 ESSENTIAL HYPERTENSION, BENIGN: Primary | ICD-10-CM

## 2024-04-10 DIAGNOSIS — E11.69 MIXED DIABETIC HYPERLIPIDEMIA ASSOCIATED WITH TYPE 2 DIABETES MELLITUS (HCC): ICD-10-CM

## 2024-04-10 DIAGNOSIS — E78.2 MIXED DIABETIC HYPERLIPIDEMIA ASSOCIATED WITH TYPE 2 DIABETES MELLITUS (HCC): ICD-10-CM

## 2024-04-10 DIAGNOSIS — E11.42 TYPE 2 DIABETES MELLITUS WITH DIABETIC POLYNEUROPATHY, WITHOUT LONG-TERM CURRENT USE OF INSULIN (HCC): ICD-10-CM

## 2024-04-10 LAB
ALBUMIN SERPL-MCNC: 3.1 G/DL (ref 3.2–4.6)
ALBUMIN/GLOB SERPL: 0.8 (ref 0.4–1.6)
ALP SERPL-CCNC: 103 U/L (ref 50–136)
ALT SERPL-CCNC: 19 U/L (ref 12–65)
ANION GAP SERPL CALC-SCNC: 2 MMOL/L (ref 2–11)
AST SERPL-CCNC: 18 U/L (ref 15–37)
BASOPHILS # BLD: 0 K/UL (ref 0–0.2)
BASOPHILS NFR BLD: 0 % (ref 0–2)
BILIRUB SERPL-MCNC: 0.6 MG/DL (ref 0.2–1.1)
BUN SERPL-MCNC: 14 MG/DL (ref 8–23)
CALCIUM SERPL-MCNC: 9.2 MG/DL (ref 8.3–10.4)
CHLORIDE SERPL-SCNC: 107 MMOL/L (ref 103–113)
CHOLEST SERPL-MCNC: 169 MG/DL
CO2 SERPL-SCNC: 30 MMOL/L (ref 21–32)
CREAT SERPL-MCNC: 0.8 MG/DL (ref 0.6–1)
CREAT UR-MCNC: 245 MG/DL
DIFFERENTIAL METHOD BLD: NORMAL
EOSINOPHIL # BLD: 0.1 K/UL (ref 0–0.8)
EOSINOPHIL NFR BLD: 1 % (ref 0.5–7.8)
ERYTHROCYTE [DISTWIDTH] IN BLOOD BY AUTOMATED COUNT: 12.8 % (ref 11.9–14.6)
GLOBULIN SER CALC-MCNC: 3.7 G/DL (ref 2.8–4.5)
GLUCOSE SERPL-MCNC: 157 MG/DL (ref 65–100)
HCT VFR BLD AUTO: 38.7 % (ref 35.8–46.3)
HDLC SERPL-MCNC: 47 MG/DL (ref 40–60)
HDLC SERPL: 3.6
HGB BLD-MCNC: 12.8 G/DL (ref 11.7–15.4)
IMM GRANULOCYTES # BLD AUTO: 0 K/UL (ref 0–0.5)
IMM GRANULOCYTES NFR BLD AUTO: 0 % (ref 0–5)
LDLC SERPL CALC-MCNC: 91.2 MG/DL
LYMPHOCYTES # BLD: 3 K/UL (ref 0.5–4.6)
LYMPHOCYTES NFR BLD: 30 % (ref 13–44)
MCH RBC QN AUTO: 31.1 PG (ref 26.1–32.9)
MCHC RBC AUTO-ENTMCNC: 33.1 G/DL (ref 31.4–35)
MCV RBC AUTO: 94.2 FL (ref 82–102)
MICROALBUMIN UR-MCNC: 1.58 MG/DL
MICROALBUMIN/CREAT UR-RTO: 6 MG/G (ref 0–30)
MONOCYTES # BLD: 0.5 K/UL (ref 0.1–1.3)
MONOCYTES NFR BLD: 5 % (ref 4–12)
NEUTS SEG # BLD: 6.1 K/UL (ref 1.7–8.2)
NEUTS SEG NFR BLD: 64 % (ref 43–78)
NRBC # BLD: 0 K/UL (ref 0–0.2)
PLATELET # BLD AUTO: 261 K/UL (ref 150–450)
PMV BLD AUTO: 11.2 FL (ref 9.4–12.3)
POTASSIUM SERPL-SCNC: 4.4 MMOL/L (ref 3.5–5.1)
PROT SERPL-MCNC: 6.8 G/DL (ref 6.3–8.2)
RBC # BLD AUTO: 4.11 M/UL (ref 4.05–5.2)
SODIUM SERPL-SCNC: 139 MMOL/L (ref 136–146)
TRIGL SERPL-MCNC: 154 MG/DL (ref 35–150)
VLDLC SERPL CALC-MCNC: 30.8 MG/DL (ref 6–23)
WBC # BLD AUTO: 9.8 K/UL (ref 4.3–11.1)

## 2024-04-11 LAB
EST. AVERAGE GLUCOSE BLD GHB EST-MCNC: 151 MG/DL
HBA1C MFR BLD: 6.9 % (ref 4.8–5.6)

## 2024-04-11 NOTE — TELEPHONE ENCOUNTER
I spoke w/ Ned to let her know that the check was just received.  I will fax her the 14B next week as I am at one office and the paperwork is at another office and Friday, Dr. Ball is in sx. She voiced understanding.

## 2024-04-17 ENCOUNTER — OFFICE VISIT (OUTPATIENT)
Dept: FAMILY MEDICINE CLINIC | Facility: CLINIC | Age: 74
End: 2024-04-17
Payer: COMMERCIAL

## 2024-04-17 VITALS
OXYGEN SATURATION: 98 % | BODY MASS INDEX: 29.37 KG/M2 | DIASTOLIC BLOOD PRESSURE: 72 MMHG | WEIGHT: 172 LBS | RESPIRATION RATE: 16 BRPM | HEIGHT: 64 IN | HEART RATE: 78 BPM | SYSTOLIC BLOOD PRESSURE: 158 MMHG | TEMPERATURE: 97.3 F

## 2024-04-17 DIAGNOSIS — E11.42 TYPE 2 DIABETES MELLITUS WITH DIABETIC POLYNEUROPATHY, WITHOUT LONG-TERM CURRENT USE OF INSULIN (HCC): ICD-10-CM

## 2024-04-17 DIAGNOSIS — H35.3211 EXUDATIVE AGE-RELATED MACULAR DEGENERATION OF RIGHT EYE WITH ACTIVE CHOROIDAL NEOVASCULARIZATION (HCC): ICD-10-CM

## 2024-04-17 DIAGNOSIS — E66.9 OBESITY, CLASS I, BMI 30.0-34.9 (SEE ACTUAL BMI): ICD-10-CM

## 2024-04-17 DIAGNOSIS — E78.2 MIXED DIABETIC HYPERLIPIDEMIA ASSOCIATED WITH TYPE 2 DIABETES MELLITUS (HCC): ICD-10-CM

## 2024-04-17 DIAGNOSIS — E11.9 TYPE 2 DIABETES MELLITUS WITHOUT COMPLICATION, WITHOUT LONG-TERM CURRENT USE OF INSULIN (HCC): ICD-10-CM

## 2024-04-17 DIAGNOSIS — G89.29 CHRONIC RIGHT SHOULDER PAIN: ICD-10-CM

## 2024-04-17 DIAGNOSIS — M25.511 CHRONIC RIGHT SHOULDER PAIN: ICD-10-CM

## 2024-04-17 DIAGNOSIS — Z12.31 SCREENING MAMMOGRAM FOR HIGH-RISK PATIENT: ICD-10-CM

## 2024-04-17 DIAGNOSIS — E11.69 MIXED DIABETIC HYPERLIPIDEMIA ASSOCIATED WITH TYPE 2 DIABETES MELLITUS (HCC): ICD-10-CM

## 2024-04-17 DIAGNOSIS — I10 ESSENTIAL HYPERTENSION, BENIGN: ICD-10-CM

## 2024-04-17 DIAGNOSIS — Z12.11 SCREENING FOR MALIGNANT NEOPLASM OF COLON: ICD-10-CM

## 2024-04-17 DIAGNOSIS — Z00.00 MEDICARE ANNUAL WELLNESS VISIT, SUBSEQUENT: Primary | ICD-10-CM

## 2024-04-17 PROCEDURE — 3078F DIAST BP <80 MM HG: CPT | Performed by: FAMILY MEDICINE

## 2024-04-17 PROCEDURE — G0439 PPPS, SUBSEQ VISIT: HCPCS | Performed by: FAMILY MEDICINE

## 2024-04-17 PROCEDURE — 99214 OFFICE O/P EST MOD 30 MIN: CPT | Performed by: FAMILY MEDICINE

## 2024-04-17 PROCEDURE — 3077F SYST BP >= 140 MM HG: CPT | Performed by: FAMILY MEDICINE

## 2024-04-17 PROCEDURE — 3044F HG A1C LEVEL LT 7.0%: CPT | Performed by: FAMILY MEDICINE

## 2024-04-17 PROCEDURE — 1123F ACP DISCUSS/DSCN MKR DOCD: CPT | Performed by: FAMILY MEDICINE

## 2024-04-17 RX ORDER — OLMESARTAN MEDOXOMIL 40 MG/1
40 TABLET ORAL DAILY
Qty: 90 TABLET | Refills: 3 | Status: SHIPPED | OUTPATIENT
Start: 2024-04-17

## 2024-04-17 RX ORDER — FLUTICASONE PROPIONATE 50 MCG
SPRAY, SUSPENSION (ML) NASAL
Qty: 1 EACH | Refills: 0 | Status: SHIPPED | OUTPATIENT
Start: 2024-04-17

## 2024-04-17 ASSESSMENT — PATIENT HEALTH QUESTIONNAIRE - PHQ9
SUM OF ALL RESPONSES TO PHQ QUESTIONS 1-9: 0
SUM OF ALL RESPONSES TO PHQ9 QUESTIONS 1 & 2: 0
SUM OF ALL RESPONSES TO PHQ QUESTIONS 1-9: 0
1. LITTLE INTEREST OR PLEASURE IN DOING THINGS: NOT AT ALL
2. FEELING DOWN, DEPRESSED OR HOPELESS: NOT AT ALL

## 2024-04-17 NOTE — PROGRESS NOTES
Medicare Annual Wellness Visit    Radha Wylie is here for Medicare AWV    Assessment & Plan   Medicare annual wellness visit, subsequent  Essential hypertension, benign  The following orders have not been finalized:  -     olmesartan (BENICAR) 40 MG tablet  Acute maxillary sinusitis, recurrence not specified  The following orders have not been finalized:  -     fluticasone (FLONASE) 50 MCG/ACT nasal spray    Recommendations for Preventive Services Due: see orders and patient instructions/AVS.  Recommended screening schedule for the next 5-10 years is provided to the patient in written form: see Patient Instructions/AVS.     No follow-ups on file.     Subjective   The following acute and/or chronic problems were also addressed today:  She still does have trouble with the distal end of her left fifth digit.  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  They said that she in order to straighten it she would have to have a pin placed and then it would make her finger stiff.  She did want to go for second opinion but it was all the way in [place].    She does have type 2 diabetes and sees endocrinology and has been doing well with Trulicity.  She does take medication of her blood pressure and it usually runs normal but past several times it has been up.  She does also need her Flonase refilled.    She does have macular degeneration and gets eye exams done regularly.    She will go and get her shingles and RSV vaccines.  Patient does need a colonoscopy and mammogram and she will go for these.    Watch blood pressure and follow up one month - may need add'l meds.    Her right shoulder has really been bothering her and it is hard to lift it above shoulder height.  This been going on for quite a long time.  I did give her diclofenac gel.  She needs physical therapy but she does not want to do that at this time.    Patient's complete Health Risk Assessment and

## 2024-05-22 ENCOUNTER — OFFICE VISIT (OUTPATIENT)
Dept: FAMILY MEDICINE CLINIC | Facility: CLINIC | Age: 74
End: 2024-05-22
Payer: COMMERCIAL

## 2024-05-22 VITALS
BODY MASS INDEX: 29.6 KG/M2 | DIASTOLIC BLOOD PRESSURE: 59 MMHG | HEART RATE: 61 BPM | SYSTOLIC BLOOD PRESSURE: 133 MMHG | WEIGHT: 173.4 LBS | HEIGHT: 64 IN | RESPIRATION RATE: 16 BRPM | TEMPERATURE: 97.4 F | OXYGEN SATURATION: 98 %

## 2024-05-22 DIAGNOSIS — I10 PRIMARY HYPERTENSION: Primary | ICD-10-CM

## 2024-05-22 DIAGNOSIS — F33.1 MODERATE EPISODE OF RECURRENT MAJOR DEPRESSIVE DISORDER (HCC): ICD-10-CM

## 2024-05-22 DIAGNOSIS — R53.82 CHRONIC FATIGUE: ICD-10-CM

## 2024-05-22 DIAGNOSIS — E53.8 B12 DEFICIENCY: ICD-10-CM

## 2024-05-22 DIAGNOSIS — E53.8 FOLATE DEFICIENCY: ICD-10-CM

## 2024-05-22 LAB
FOLATE SERPL-MCNC: 27.5 NG/ML (ref 3.1–17.5)
TSH, 3RD GENERATION: 1.46 UIU/ML (ref 0.27–4.2)
VIT B12 SERPL-MCNC: 656 PG/ML (ref 193–986)

## 2024-05-22 PROCEDURE — 1123F ACP DISCUSS/DSCN MKR DOCD: CPT | Performed by: FAMILY MEDICINE

## 2024-05-22 PROCEDURE — 99214 OFFICE O/P EST MOD 30 MIN: CPT | Performed by: FAMILY MEDICINE

## 2024-05-22 PROCEDURE — 3075F SYST BP GE 130 - 139MM HG: CPT | Performed by: FAMILY MEDICINE

## 2024-05-22 PROCEDURE — 3078F DIAST BP <80 MM HG: CPT | Performed by: FAMILY MEDICINE

## 2024-05-22 RX ORDER — FLUTICASONE PROPIONATE 50 MCG
SPRAY, SUSPENSION (ML) NASAL
Qty: 3 EACH | Refills: 3 | Status: SHIPPED | OUTPATIENT
Start: 2024-05-22

## 2024-05-22 RX ORDER — VENLAFAXINE HYDROCHLORIDE 37.5 MG/1
37.5 CAPSULE, EXTENDED RELEASE ORAL DAILY
Qty: 30 CAPSULE | Refills: 3 | Status: SHIPPED | OUTPATIENT
Start: 2024-05-22

## 2024-05-22 SDOH — ECONOMIC STABILITY: FOOD INSECURITY: WITHIN THE PAST 12 MONTHS, YOU WORRIED THAT YOUR FOOD WOULD RUN OUT BEFORE YOU GOT MONEY TO BUY MORE.: SOMETIMES TRUE

## 2024-05-22 SDOH — ECONOMIC STABILITY: INCOME INSECURITY: HOW HARD IS IT FOR YOU TO PAY FOR THE VERY BASICS LIKE FOOD, HOUSING, MEDICAL CARE, AND HEATING?: SOMEWHAT HARD

## 2024-05-22 SDOH — ECONOMIC STABILITY: FOOD INSECURITY: WITHIN THE PAST 12 MONTHS, THE FOOD YOU BOUGHT JUST DIDN'T LAST AND YOU DIDN'T HAVE MONEY TO GET MORE.: NEVER TRUE

## 2024-05-22 ASSESSMENT — PATIENT HEALTH QUESTIONNAIRE - PHQ9
SUM OF ALL RESPONSES TO PHQ QUESTIONS 1-9: 12
1. LITTLE INTEREST OR PLEASURE IN DOING THINGS: SEVERAL DAYS
SUM OF ALL RESPONSES TO PHQ QUESTIONS 1-9: 12
SUM OF ALL RESPONSES TO PHQ QUESTIONS 1-9: 12
2. FEELING DOWN, DEPRESSED OR HOPELESS: MORE THAN HALF THE DAYS
SUM OF ALL RESPONSES TO PHQ9 QUESTIONS 1 & 2: 3
10. IF YOU CHECKED OFF ANY PROBLEMS, HOW DIFFICULT HAVE THESE PROBLEMS MADE IT FOR YOU TO DO YOUR WORK, TAKE CARE OF THINGS AT HOME, OR GET ALONG WITH OTHER PEOPLE: SOMEWHAT DIFFICULT
6. FEELING BAD ABOUT YOURSELF - OR THAT YOU ARE A FAILURE OR HAVE LET YOURSELF OR YOUR FAMILY DOWN: NOT AT ALL
SUM OF ALL RESPONSES TO PHQ QUESTIONS 1-9: 12
3. TROUBLE FALLING OR STAYING ASLEEP: NEARLY EVERY DAY
7. TROUBLE CONCENTRATING ON THINGS, SUCH AS READING THE NEWSPAPER OR WATCHING TELEVISION: SEVERAL DAYS
9. THOUGHTS THAT YOU WOULD BE BETTER OFF DEAD, OR OF HURTING YOURSELF: NOT AT ALL
4. FEELING TIRED OR HAVING LITTLE ENERGY: NEARLY EVERY DAY
8. MOVING OR SPEAKING SO SLOWLY THAT OTHER PEOPLE COULD HAVE NOTICED. OR THE OPPOSITE, BEING SO FIGETY OR RESTLESS THAT YOU HAVE BEEN MOVING AROUND A LOT MORE THAN USUAL: NOT AT ALL
5. POOR APPETITE OR OVEREATING: MORE THAN HALF THE DAYS

## 2024-05-22 NOTE — PROGRESS NOTES
[facility] [place] [address]  Phone: [phone] Fax [phone]  Andreia Haile MD  5/22/2024           Ms. Wylie  is a 73 y.o.  year old  female patient who comes in for follow up on blood pressure. Current med(s) for this are benicar 40 mg. Blood pressure runs normal at home.  No side effects.    She has had problems with fatigue and not feeling like doing anything.  She has had problems in the past with depression and she thinks she may be depressed.  No thoughts of suicide but not.  She also did have blood work when she was here a couple months ago but it did not check her thyroid and she is wondering if that or something else could be going on.        5/22/2024     2:23 PM   PHQ-9    Little interest or pleasure in doing things 1   Feeling down, depressed, or hopeless 2   Trouble falling or staying asleep, or sleeping too much 3   Feeling tired or having little energy 3   Poor appetite or overeating 2   Feeling bad about yourself - or that you are a failure or have let yourself or your family down 0   Trouble concentrating on things, such as reading the newspaper or watching television 1   Moving or speaking so slowly that other people could have noticed. Or the opposite - being so fidgety or restless that you have been moving around a lot more than usual 0   Thoughts that you would be better off dead, or of hurting yourself in some way 0   PHQ-2 Score 3   PHQ-9 Total Score 12   If you checked off any problems, how difficult have these problems made it for you to do your work, take care of things at home, or get along with other people? 1        Past Medical History:   Diagnosis Date    Diabetes (HCC)     Hypertension     controlled with med-denies SOB with 1 flight step    Menopause     Mixed diabetic hyperlipidemia associated with type 2 diabetes mellitus (HCC) 8/24/2020    Tubulovillous adenoma of colon 4/28/2017    Type 2 diabetes mellitus with

## 2024-05-23 ENCOUNTER — TELEPHONE (OUTPATIENT)
Dept: FAMILY MEDICINE CLINIC | Facility: CLINIC | Age: 74
End: 2024-05-23

## 2024-05-24 NOTE — RESULT ENCOUNTER NOTE
Let patient know labs normal except her folate was slightly high.  If she takes any folate supplements stop them.

## 2024-08-07 DIAGNOSIS — F33.1 MODERATE EPISODE OF RECURRENT MAJOR DEPRESSIVE DISORDER (HCC): ICD-10-CM

## 2024-08-07 RX ORDER — VENLAFAXINE HYDROCHLORIDE 37.5 MG/1
37.5 CAPSULE, EXTENDED RELEASE ORAL DAILY
Qty: 30 CAPSULE | Refills: 3 | Status: SHIPPED | OUTPATIENT
Start: 2024-08-07

## 2024-08-07 NOTE — TELEPHONE ENCOUNTER
Pt rescheduled appt with Dr. Haile on 10/02. Pt will be out of Effexor before appt. Please send into [facility] in [place].   Pt states that she is doing very well in this medication

## 2024-10-02 ENCOUNTER — OFFICE VISIT (OUTPATIENT)
Dept: FAMILY MEDICINE CLINIC | Facility: CLINIC | Age: 74
End: 2024-10-02
Payer: COMMERCIAL

## 2024-10-02 DIAGNOSIS — F33.1 MODERATE EPISODE OF RECURRENT MAJOR DEPRESSIVE DISORDER (HCC): Primary | ICD-10-CM

## 2024-10-02 DIAGNOSIS — I10 PRIMARY HYPERTENSION: ICD-10-CM

## 2024-10-02 DIAGNOSIS — G89.29 CHRONIC RIGHT SHOULDER PAIN: ICD-10-CM

## 2024-10-02 DIAGNOSIS — M25.511 CHRONIC RIGHT SHOULDER PAIN: ICD-10-CM

## 2024-10-02 DIAGNOSIS — Z91.09 POLLEN ALLERGIES: ICD-10-CM

## 2024-10-02 PROCEDURE — 1123F ACP DISCUSS/DSCN MKR DOCD: CPT | Performed by: FAMILY MEDICINE

## 2024-10-02 PROCEDURE — 99214 OFFICE O/P EST MOD 30 MIN: CPT | Performed by: FAMILY MEDICINE

## 2024-10-02 PROCEDURE — G8427 DOCREV CUR MEDS BY ELIG CLIN: HCPCS | Performed by: FAMILY MEDICINE

## 2024-10-02 PROCEDURE — 1036F TOBACCO NON-USER: CPT | Performed by: FAMILY MEDICINE

## 2024-10-02 PROCEDURE — G8399 PT W/DXA RESULTS DOCUMENT: HCPCS | Performed by: FAMILY MEDICINE

## 2024-10-02 PROCEDURE — 3078F DIAST BP <80 MM HG: CPT | Performed by: FAMILY MEDICINE

## 2024-10-02 PROCEDURE — 3017F COLORECTAL CA SCREEN DOC REV: CPT | Performed by: FAMILY MEDICINE

## 2024-10-02 PROCEDURE — 1090F PRES/ABSN URINE INCON ASSESS: CPT | Performed by: FAMILY MEDICINE

## 2024-10-02 PROCEDURE — 3074F SYST BP LT 130 MM HG: CPT | Performed by: FAMILY MEDICINE

## 2024-10-02 PROCEDURE — G8484 FLU IMMUNIZE NO ADMIN: HCPCS | Performed by: FAMILY MEDICINE

## 2024-10-02 PROCEDURE — G8417 CALC BMI ABV UP PARAM F/U: HCPCS | Performed by: FAMILY MEDICINE

## 2024-10-02 RX ORDER — OLMESARTAN MEDOXOMIL 40 MG/1
40 TABLET ORAL DAILY
Qty: 90 TABLET | Refills: 3 | Status: SHIPPED | OUTPATIENT
Start: 2024-10-02

## 2024-10-02 RX ORDER — FLUTICASONE PROPIONATE 50 MCG
SPRAY, SUSPENSION (ML) NASAL
Qty: 3 EACH | Refills: 3 | Status: SHIPPED | OUTPATIENT
Start: 2024-10-02

## 2024-10-02 RX ORDER — VENLAFAXINE HYDROCHLORIDE 75 MG/1
75 CAPSULE, EXTENDED RELEASE ORAL DAILY
Qty: 90 CAPSULE | Refills: 3 | Status: SHIPPED | OUTPATIENT
Start: 2024-10-02

## 2024-10-02 ASSESSMENT — PATIENT HEALTH QUESTIONNAIRE - PHQ9
SUM OF ALL RESPONSES TO PHQ QUESTIONS 1-9: 7
9. THOUGHTS THAT YOU WOULD BE BETTER OFF DEAD, OR OF HURTING YOURSELF: NOT AT ALL
SUM OF ALL RESPONSES TO PHQ QUESTIONS 1-9: 7
6. FEELING BAD ABOUT YOURSELF - OR THAT YOU ARE A FAILURE OR HAVE LET YOURSELF OR YOUR FAMILY DOWN: NOT AT ALL
5. POOR APPETITE OR OVEREATING: NEARLY EVERY DAY
7. TROUBLE CONCENTRATING ON THINGS, SUCH AS READING THE NEWSPAPER OR WATCHING TELEVISION: NOT AT ALL
4. FEELING TIRED OR HAVING LITTLE ENERGY: MORE THAN HALF THE DAYS
8. MOVING OR SPEAKING SO SLOWLY THAT OTHER PEOPLE COULD HAVE NOTICED. OR THE OPPOSITE, BEING SO FIGETY OR RESTLESS THAT YOU HAVE BEEN MOVING AROUND A LOT MORE THAN USUAL: NOT AT ALL
1. LITTLE INTEREST OR PLEASURE IN DOING THINGS: NOT AT ALL
SUM OF ALL RESPONSES TO PHQ9 QUESTIONS 1 & 2: 0
SUM OF ALL RESPONSES TO PHQ QUESTIONS 1-9: 7
3. TROUBLE FALLING OR STAYING ASLEEP: MORE THAN HALF THE DAYS
SUM OF ALL RESPONSES TO PHQ QUESTIONS 1-9: 7
2. FEELING DOWN, DEPRESSED OR HOPELESS: NOT AT ALL
10. IF YOU CHECKED OFF ANY PROBLEMS, HOW DIFFICULT HAVE THESE PROBLEMS MADE IT FOR YOU TO DO YOUR WORK, TAKE CARE OF THINGS AT HOME, OR GET ALONG WITH OTHER PEOPLE: NOT DIFFICULT AT ALL

## 2024-10-02 NOTE — PROGRESS NOTES
[facility] [place] [address]  Phone: [phone] Fax [phone]  Andreia Haile MD  10/2/2024           Ms. Wylie  is a 74 y.o.  year old  female patient who comes in for follow-up on issues related to depression.  She does feel that the Effexor has helped.  She does not feel that it is completely all the way where it needs to be.  She does have low motivation.  No thoughts of suicide.        10/2/2024     3:45 PM   PHQ-9    Little interest or pleasure in doing things 0   Feeling down, depressed, or hopeless 0   Trouble falling or staying asleep, or sleeping too much 2   Feeling tired or having little energy 2   Poor appetite or overeating 3   Feeling bad about yourself - or that you are a failure or have let yourself or your family down 0   Trouble concentrating on things, such as reading the newspaper or watching television 0   Moving or speaking so slowly that other people could have noticed. Or the opposite - being so fidgety or restless that you have been moving around a lot more than usual 0   Thoughts that you would be better off dead, or of hurting yourself in some way 0   PHQ-2 Score 0   PHQ-9 Total Score 7   If you checked off any problems, how difficult have these problems made it for you to do your work, take care of things at home, or get along with other people? 0        She does need medication refills on her blood pressure medication.    She also needs refills on her Voltaren gel and on her Flonase.             Ms. Wylie  has  has a past medical history of Diabetes ([facility]), Hypertension, Menopause, Mixed diabetic hyperlipidemia associated with type 2 diabetes mellitus ([facility]), Tubulovillous adenoma of colon, Type 2 diabetes mellitus with diabetic polyneuropathy, without long-term current use of insulin ([facility]), and Type II or unspecified type diabetes mellitus without mention of complication, not stated as uncontrolled.    Ms. Wylie  has  has a

## 2024-10-03 VITALS
TEMPERATURE: 97.5 F | HEART RATE: 72 BPM | SYSTOLIC BLOOD PRESSURE: 125 MMHG | RESPIRATION RATE: 17 BRPM | BODY MASS INDEX: 28.92 KG/M2 | DIASTOLIC BLOOD PRESSURE: 74 MMHG | HEIGHT: 64 IN | WEIGHT: 169.4 LBS | OXYGEN SATURATION: 98 %

## 2024-11-13 ENCOUNTER — OFFICE VISIT (OUTPATIENT)
Dept: ENDOCRINOLOGY | Age: 74
End: 2024-11-13
Payer: MEDICARE

## 2024-11-13 VITALS
WEIGHT: 150.6 LBS | DIASTOLIC BLOOD PRESSURE: 82 MMHG | OXYGEN SATURATION: 97 % | BODY MASS INDEX: 25.85 KG/M2 | HEART RATE: 86 BPM | SYSTOLIC BLOOD PRESSURE: 124 MMHG

## 2024-11-13 DIAGNOSIS — T46.6X5A MYALGIA DUE TO STATIN: ICD-10-CM

## 2024-11-13 DIAGNOSIS — M79.10 MYALGIA DUE TO STATIN: ICD-10-CM

## 2024-11-13 DIAGNOSIS — E11.69 MIXED DIABETIC HYPERLIPIDEMIA ASSOCIATED WITH TYPE 2 DIABETES MELLITUS (HCC): ICD-10-CM

## 2024-11-13 DIAGNOSIS — E78.2 MIXED DIABETIC HYPERLIPIDEMIA ASSOCIATED WITH TYPE 2 DIABETES MELLITUS (HCC): ICD-10-CM

## 2024-11-13 DIAGNOSIS — E11.42 TYPE 2 DIABETES MELLITUS WITH DIABETIC POLYNEUROPATHY, WITHOUT LONG-TERM CURRENT USE OF INSULIN (HCC): Primary | ICD-10-CM

## 2024-11-13 DIAGNOSIS — I10 ESSENTIAL HYPERTENSION, BENIGN: ICD-10-CM

## 2024-11-13 LAB — HBA1C MFR BLD: 7.7 %

## 2024-11-13 PROCEDURE — 3079F DIAST BP 80-89 MM HG: CPT | Performed by: PHYSICIAN ASSISTANT

## 2024-11-13 PROCEDURE — 3044F HG A1C LEVEL LT 7.0%: CPT | Performed by: PHYSICIAN ASSISTANT

## 2024-11-13 PROCEDURE — 2022F DILAT RTA XM EVC RTNOPTHY: CPT | Performed by: PHYSICIAN ASSISTANT

## 2024-11-13 PROCEDURE — 99214 OFFICE O/P EST MOD 30 MIN: CPT | Performed by: PHYSICIAN ASSISTANT

## 2024-11-13 PROCEDURE — 1090F PRES/ABSN URINE INCON ASSESS: CPT | Performed by: PHYSICIAN ASSISTANT

## 2024-11-13 PROCEDURE — G8427 DOCREV CUR MEDS BY ELIG CLIN: HCPCS | Performed by: PHYSICIAN ASSISTANT

## 2024-11-13 PROCEDURE — 3017F COLORECTAL CA SCREEN DOC REV: CPT | Performed by: PHYSICIAN ASSISTANT

## 2024-11-13 PROCEDURE — G8417 CALC BMI ABV UP PARAM F/U: HCPCS | Performed by: PHYSICIAN ASSISTANT

## 2024-11-13 PROCEDURE — 83036 HEMOGLOBIN GLYCOSYLATED A1C: CPT | Performed by: PHYSICIAN ASSISTANT

## 2024-11-13 PROCEDURE — G8399 PT W/DXA RESULTS DOCUMENT: HCPCS | Performed by: PHYSICIAN ASSISTANT

## 2024-11-13 PROCEDURE — 3074F SYST BP LT 130 MM HG: CPT | Performed by: PHYSICIAN ASSISTANT

## 2024-11-13 PROCEDURE — G8484 FLU IMMUNIZE NO ADMIN: HCPCS | Performed by: PHYSICIAN ASSISTANT

## 2024-11-13 PROCEDURE — 1036F TOBACCO NON-USER: CPT | Performed by: PHYSICIAN ASSISTANT

## 2024-11-13 PROCEDURE — 1123F ACP DISCUSS/DSCN MKR DOCD: CPT | Performed by: PHYSICIAN ASSISTANT

## 2024-11-13 PROCEDURE — G2211 COMPLEX E/M VISIT ADD ON: HCPCS | Performed by: PHYSICIAN ASSISTANT

## 2024-11-13 RX ORDER — EMPAGLIFLOZIN 10 MG/1
10 TABLET, FILM COATED ORAL DAILY
Qty: 90 TABLET | Refills: 3 | Status: SHIPPED | OUTPATIENT
Start: 2024-11-13

## 2024-11-13 NOTE — PROGRESS NOTES
[facility] ENDOCRINOLOGY   AND   THYROID NODULE CLINIC    Shaila Taylor PA-C  [facility] Endocrinology and [facility]odule [facility]  [address], [address]  [place] [address]  Phone [phone]  Facsimile [phone]          Radha Wylie is a 74 y.o. female seen 11/13/2024 for follow up evaluation of type 2 diabetes        Assessment and Plan:    1. Type 2 diabetes mellitus with diabetic polyneuropathy, without long-term current use of insulin (HCC)  Patient previously at goal on high-dose Trulicity monotherapy. Poor tolerance of other agents. This 1 year follow-up A1c now 7.7.  Patient is only checking blood glucose fasting.  Encouraged to do paired glucose testing    Diet and exercise discussed    We will start patient on jardiance 10 mg.  Most recent renal function is stable.  We will repeat BMP at future visit.  Patient denies history of diabetic ketoacidosis.  We discussed increased risk of urinary tract and genital mycotic infections as well as basic hygiene that may help to prevent infection.    At today's visit we discussed sequela associated with uncontrolled diabetes including increased risk of stroke, heart attack, kidney failure, amputation, retinopathy, neuropathy, and nephropathy.  Patient was strongly encouraged to comply with treatment regimen as well as dietary and exercise recommendations to aid in control of this chronic disease to help prevent complications associated with uncontrolled diabetes.      - AMB POC HEMOGLOBIN A1C  - JARDIANCE 10 MG tablet; Take 1 tablet by mouth daily  Dispense: 90 tablet; Refill: 3  - HM DIABETES FOOT EXAM      2. Essential hypertension, benign  BP Readings from Last 3 Encounters:   11/13/24 124/82   10/02/24 125/74   05/22/24 (!) 133/59       3. Mixed diabetic hyperlipidemia associated with type 2 diabetes mellitus (HCC)  Patient reportedly statin intolerant.  Encouraged high-fiber diet    4. Myalgia due to statin  Patient reportedly

## 2024-12-31 RX ORDER — DULAGLUTIDE 4.5 MG/.5ML
INJECTION, SOLUTION SUBCUTANEOUS
Qty: 8 ML | Refills: 0 | OUTPATIENT
Start: 2024-12-31

## 2025-01-13 ENCOUNTER — TELEPHONE (OUTPATIENT)
Dept: ENDOCRINOLOGY | Age: 75
End: 2025-01-13

## 2025-01-13 DIAGNOSIS — E11.42 TYPE 2 DIABETES MELLITUS WITH DIABETIC POLYNEUROPATHY, WITHOUT LONG-TERM CURRENT USE OF INSULIN (HCC): ICD-10-CM

## 2025-01-13 DIAGNOSIS — E11.42 TYPE 2 DIABETES MELLITUS WITH DIABETIC POLYNEUROPATHY, WITHOUT LONG-TERM CURRENT USE OF INSULIN (HCC): Primary | ICD-10-CM

## 2025-01-13 RX ORDER — EMPAGLIFLOZIN 10 MG/1
10 TABLET, FILM COATED ORAL DAILY
Qty: 90 TABLET | Refills: 3 | Status: SHIPPED | OUTPATIENT
Start: 2025-01-13

## 2025-01-13 RX ORDER — DULAGLUTIDE 4.5 MG/.5ML
4.5 INJECTION, SOLUTION SUBCUTANEOUS
Qty: 6 ML | Refills: 3 | Status: SHIPPED | OUTPATIENT
Start: 2025-01-13

## 2025-01-13 RX ORDER — EMPAGLIFLOZIN 10 MG/1
10 TABLET, FILM COATED ORAL DAILY
Qty: 30 TABLET | Refills: 1 | Status: SHIPPED | OUTPATIENT
Start: 2025-01-13

## 2025-01-13 NOTE — TELEPHONE ENCOUNTER
11/13/24 Patient took home Patient Assistance applications for [employer].    1/13/25 Brought her applications in to the office along with Income statement.    Placed in Shaila's box to be signed  Need paper scripts for Jardiance and Trulicity

## 2025-01-13 NOTE — TELEPHONE ENCOUNTER
Patient brought by her [employer] and [employer] Re-enrollment applications today. She asked if we have any samples for Jardiance because she is out. Told her we could send a prescription to her pharmacy so she will have her medication while we wait to see if [employer] will re-approve her enrollment for 2025. She said yes please    Please send Jardiance 10mg to  [id number] - [place] - [address] - P [phone] - F [phone] [address] [place] [address]  Phone: [phone]  Fax: [phone]

## 2025-01-17 NOTE — TELEPHONE ENCOUNTER
1/14/25 Received a fax from [facility] that patient is eligible for re-enrollment in the program from 1/14/25-12/31/25.     1/17/25 Called and left voice message regarding the above information.

## 2025-02-03 ENCOUNTER — OFFICE VISIT (OUTPATIENT)
Dept: FAMILY MEDICINE CLINIC | Facility: CLINIC | Age: 75
End: 2025-02-03
Payer: MEDICARE

## 2025-02-03 VITALS
WEIGHT: 166.2 LBS | DIASTOLIC BLOOD PRESSURE: 65 MMHG | HEIGHT: 64 IN | BODY MASS INDEX: 28.38 KG/M2 | HEART RATE: 73 BPM | SYSTOLIC BLOOD PRESSURE: 140 MMHG | TEMPERATURE: 98 F | OXYGEN SATURATION: 97 %

## 2025-02-03 DIAGNOSIS — I10 PRIMARY HYPERTENSION: ICD-10-CM

## 2025-02-03 DIAGNOSIS — S80.12XA HEMATOMA OF LEFT LOWER LEG: Primary | ICD-10-CM

## 2025-02-03 PROCEDURE — 99213 OFFICE O/P EST LOW 20 MIN: CPT | Performed by: PHYSICIAN ASSISTANT

## 2025-02-03 PROCEDURE — G8427 DOCREV CUR MEDS BY ELIG CLIN: HCPCS | Performed by: PHYSICIAN ASSISTANT

## 2025-02-03 PROCEDURE — G8399 PT W/DXA RESULTS DOCUMENT: HCPCS | Performed by: PHYSICIAN ASSISTANT

## 2025-02-03 PROCEDURE — G2211 COMPLEX E/M VISIT ADD ON: HCPCS | Performed by: PHYSICIAN ASSISTANT

## 2025-02-03 PROCEDURE — 3077F SYST BP >= 140 MM HG: CPT | Performed by: PHYSICIAN ASSISTANT

## 2025-02-03 PROCEDURE — 1090F PRES/ABSN URINE INCON ASSESS: CPT | Performed by: PHYSICIAN ASSISTANT

## 2025-02-03 PROCEDURE — 1036F TOBACCO NON-USER: CPT | Performed by: PHYSICIAN ASSISTANT

## 2025-02-03 PROCEDURE — 3078F DIAST BP <80 MM HG: CPT | Performed by: PHYSICIAN ASSISTANT

## 2025-02-03 PROCEDURE — G8417 CALC BMI ABV UP PARAM F/U: HCPCS | Performed by: PHYSICIAN ASSISTANT

## 2025-02-03 PROCEDURE — 3017F COLORECTAL CA SCREEN DOC REV: CPT | Performed by: PHYSICIAN ASSISTANT

## 2025-02-03 PROCEDURE — 1123F ACP DISCUSS/DSCN MKR DOCD: CPT | Performed by: PHYSICIAN ASSISTANT

## 2025-02-03 SDOH — ECONOMIC STABILITY: FOOD INSECURITY: WITHIN THE PAST 12 MONTHS, YOU WORRIED THAT YOUR FOOD WOULD RUN OUT BEFORE YOU GOT MONEY TO BUY MORE.: NEVER TRUE

## 2025-02-03 SDOH — ECONOMIC STABILITY: FOOD INSECURITY: WITHIN THE PAST 12 MONTHS, THE FOOD YOU BOUGHT JUST DIDN'T LAST AND YOU DIDN'T HAVE MONEY TO GET MORE.: NEVER TRUE

## 2025-02-03 ASSESSMENT — PATIENT HEALTH QUESTIONNAIRE - PHQ9
9. THOUGHTS THAT YOU WOULD BE BETTER OFF DEAD, OR OF HURTING YOURSELF: NOT AT ALL
10. IF YOU CHECKED OFF ANY PROBLEMS, HOW DIFFICULT HAVE THESE PROBLEMS MADE IT FOR YOU TO DO YOUR WORK, TAKE CARE OF THINGS AT HOME, OR GET ALONG WITH OTHER PEOPLE: NOT DIFFICULT AT ALL
2. FEELING DOWN, DEPRESSED OR HOPELESS: NOT AT ALL
SUM OF ALL RESPONSES TO PHQ QUESTIONS 1-9: 0
7. TROUBLE CONCENTRATING ON THINGS, SUCH AS READING THE NEWSPAPER OR WATCHING TELEVISION: NOT AT ALL
SUM OF ALL RESPONSES TO PHQ QUESTIONS 1-9: 0
8. MOVING OR SPEAKING SO SLOWLY THAT OTHER PEOPLE COULD HAVE NOTICED. OR THE OPPOSITE, BEING SO FIGETY OR RESTLESS THAT YOU HAVE BEEN MOVING AROUND A LOT MORE THAN USUAL: NOT AT ALL
SUM OF ALL RESPONSES TO PHQ QUESTIONS 1-9: 0
3. TROUBLE FALLING OR STAYING ASLEEP: NOT AT ALL
5. POOR APPETITE OR OVEREATING: NOT AT ALL
1. LITTLE INTEREST OR PLEASURE IN DOING THINGS: NOT AT ALL
SUM OF ALL RESPONSES TO PHQ QUESTIONS 1-9: 0
SUM OF ALL RESPONSES TO PHQ9 QUESTIONS 1 & 2: 0
6. FEELING BAD ABOUT YOURSELF - OR THAT YOU ARE A FAILURE OR HAVE LET YOURSELF OR YOUR FAMILY DOWN: NOT AT ALL
4. FEELING TIRED OR HAVING LITTLE ENERGY: NOT AT ALL

## 2025-02-03 ASSESSMENT — ENCOUNTER SYMPTOMS
SHORTNESS OF BREATH: 0
COLOR CHANGE: 1

## 2025-02-03 NOTE — PROGRESS NOTES
[facility] [address] [place] [address]  Phone [phone]      Patient: Radha Wylie  YOB: 1950  Age 74 y.o.  Sex female  Medical Record:  [id number]  Visit Date: 02/03/25  Author:  Bronw Duong PA-C    Family Practice Clinic Note    Chief Complaint   Patient presents with    Other     Lump on left leg       History of Present Illness  This is a 74-year-old female with a history of diabetes and hypertension who presents today with concerns regarding a tender lump on her left lower leg.  She does not recall any preceding injury or trauma to the area.  She has seen some bruising in the area.  Taking Tylenol for the discomfort which does provide some temporary relief.  Denies swelling of the lower leg/foot.  Denies numbness or tingling of the extremity.    Past History:    Past Medical history   Past Medical History:   Diagnosis Date    Diabetes ([facility])     Hypertension     controlled with med-denies SOB with 1 flight step    Menopause     Mixed diabetic hyperlipidemia associated with type 2 diabetes mellitus ([facility]) 8/24/2020    Tubulovillous adenoma of colon 4/28/2017    Type 2 diabetes mellitus with diabetic polyneuropathy, without long-term current use of insulin ([facility]) 06/22/2017    type 2; does not check glucose- last A1C= 8.9 on 2/15/17    Type II or unspecified type diabetes mellitus without mention of complication, not stated as uncontrolled 08/05/2015    [id number] symptoms-[id number]       Current Problem List:   Patient Active Problem List   Diagnosis    Incisional hernia with obstruction but no gangrene    Tubulovillous adenoma of colon    Primary hypertension    Type II diabetes mellitus (HCC)    Type 2 diabetes mellitus with diabetic polyneuropathy, without long-term current use of insulin ([facility])    Exudative age-related macular degeneration of right eye with active choroidal neovascularization ([facility])    Mixed diabetic hyperlipidemia associated with type 2

## 2025-02-25 ENCOUNTER — TELEPHONE (OUTPATIENT)
Dept: FAMILY MEDICINE CLINIC | Facility: CLINIC | Age: 75
End: 2025-02-25

## 2025-02-25 NOTE — TELEPHONE ENCOUNTER
Form to sign and fax needs to be done by tomorrow Chronic Condition CINTHYA put in tray 02-25-25 time 11:43

## 2025-03-18 ENCOUNTER — TELEPHONE (OUTPATIENT)
Dept: FAMILY MEDICINE CLINIC | Facility: CLINIC | Age: 75
End: 2025-03-18

## 2025-03-18 NOTE — TELEPHONE ENCOUNTER
Chronic Condition Release of Information Form received from [facility]. Placed in clinical staff tray in front office for Dr Haile.

## 2025-03-26 ENCOUNTER — OFFICE VISIT (OUTPATIENT)
Dept: ENDOCRINOLOGY | Age: 75
End: 2025-03-26
Payer: MEDICARE

## 2025-03-26 ENCOUNTER — RESULTS FOLLOW-UP (OUTPATIENT)
Dept: ENDOCRINOLOGY | Age: 75
End: 2025-03-26

## 2025-03-26 VITALS
BODY MASS INDEX: 27.96 KG/M2 | OXYGEN SATURATION: 97 % | SYSTOLIC BLOOD PRESSURE: 120 MMHG | WEIGHT: 163.8 LBS | DIASTOLIC BLOOD PRESSURE: 68 MMHG | HEART RATE: 69 BPM | HEIGHT: 64 IN

## 2025-03-26 DIAGNOSIS — I10 ESSENTIAL HYPERTENSION, BENIGN: ICD-10-CM

## 2025-03-26 DIAGNOSIS — I83.812 VARICOSE VEINS OF LEG WITH PAIN, LEFT: ICD-10-CM

## 2025-03-26 DIAGNOSIS — E11.69 MIXED DIABETIC HYPERLIPIDEMIA ASSOCIATED WITH TYPE 2 DIABETES MELLITUS: ICD-10-CM

## 2025-03-26 DIAGNOSIS — T46.6X5A MYALGIA DUE TO STATIN: ICD-10-CM

## 2025-03-26 DIAGNOSIS — E78.2 MIXED DIABETIC HYPERLIPIDEMIA ASSOCIATED WITH TYPE 2 DIABETES MELLITUS: ICD-10-CM

## 2025-03-26 DIAGNOSIS — M79.10 MYALGIA DUE TO STATIN: ICD-10-CM

## 2025-03-26 DIAGNOSIS — E11.42 TYPE 2 DIABETES MELLITUS WITH DIABETIC POLYNEUROPATHY, WITHOUT LONG-TERM CURRENT USE OF INSULIN (HCC): ICD-10-CM

## 2025-03-26 DIAGNOSIS — E11.42 TYPE 2 DIABETES MELLITUS WITH DIABETIC POLYNEUROPATHY, WITHOUT LONG-TERM CURRENT USE OF INSULIN (HCC): Primary | ICD-10-CM

## 2025-03-26 DIAGNOSIS — H35.3211 EXUDATIVE AGE-RELATED MACULAR DEGENERATION OF RIGHT EYE WITH ACTIVE CHOROIDAL NEOVASCULARIZATION (HCC): ICD-10-CM

## 2025-03-26 LAB
ALBUMIN SERPL-MCNC: 3.5 G/DL (ref 3.2–4.6)
ALBUMIN/GLOB SERPL: 1.1 (ref 1–1.9)
ALP SERPL-CCNC: 91 U/L (ref 35–104)
ALT SERPL-CCNC: 12 U/L (ref 8–45)
ANION GAP SERPL CALC-SCNC: 10 MMOL/L (ref 7–16)
AST SERPL-CCNC: 13 U/L (ref 15–37)
BILIRUB SERPL-MCNC: <0.2 MG/DL (ref 0–1.2)
BUN SERPL-MCNC: 21 MG/DL (ref 8–23)
CALCIUM SERPL-MCNC: 9.7 MG/DL (ref 8.8–10.2)
CHLORIDE SERPL-SCNC: 105 MMOL/L (ref 98–107)
CO2 SERPL-SCNC: 27 MMOL/L (ref 20–29)
CREAT SERPL-MCNC: 0.86 MG/DL (ref 0.6–1.1)
CREAT UR-MCNC: 68.2 MG/DL (ref 28–217)
GLOBULIN SER CALC-MCNC: 3.3 G/DL (ref 2.3–3.5)
GLUCOSE SERPL-MCNC: 124 MG/DL (ref 70–99)
HBA1C MFR BLD: 7.1 %
MICROALBUMIN UR-MCNC: <1.2 MG/DL (ref 0–20)
MICROALBUMIN/CREAT UR-RTO: NORMAL MG/G (ref 0–30)
POTASSIUM SERPL-SCNC: 4.5 MMOL/L (ref 3.5–5.1)
PROT SERPL-MCNC: 6.8 G/DL (ref 6.3–8.2)
SODIUM SERPL-SCNC: 142 MMOL/L (ref 136–145)
TSH W FREE THYROID IF ABNORMAL: 2.57 UIU/ML (ref 0.27–4.2)

## 2025-03-26 PROCEDURE — 1090F PRES/ABSN URINE INCON ASSESS: CPT | Performed by: PHYSICIAN ASSISTANT

## 2025-03-26 PROCEDURE — 99214 OFFICE O/P EST MOD 30 MIN: CPT | Performed by: PHYSICIAN ASSISTANT

## 2025-03-26 PROCEDURE — 3051F HG A1C>EQUAL 7.0%<8.0%: CPT | Performed by: PHYSICIAN ASSISTANT

## 2025-03-26 PROCEDURE — 2022F DILAT RTA XM EVC RTNOPTHY: CPT | Performed by: PHYSICIAN ASSISTANT

## 2025-03-26 PROCEDURE — G8427 DOCREV CUR MEDS BY ELIG CLIN: HCPCS | Performed by: PHYSICIAN ASSISTANT

## 2025-03-26 PROCEDURE — 3046F HEMOGLOBIN A1C LEVEL >9.0%: CPT | Performed by: PHYSICIAN ASSISTANT

## 2025-03-26 PROCEDURE — 1036F TOBACCO NON-USER: CPT | Performed by: PHYSICIAN ASSISTANT

## 2025-03-26 PROCEDURE — 3017F COLORECTAL CA SCREEN DOC REV: CPT | Performed by: PHYSICIAN ASSISTANT

## 2025-03-26 PROCEDURE — 83036 HEMOGLOBIN GLYCOSYLATED A1C: CPT | Performed by: PHYSICIAN ASSISTANT

## 2025-03-26 PROCEDURE — 3078F DIAST BP <80 MM HG: CPT | Performed by: PHYSICIAN ASSISTANT

## 2025-03-26 PROCEDURE — G8399 PT W/DXA RESULTS DOCUMENT: HCPCS | Performed by: PHYSICIAN ASSISTANT

## 2025-03-26 PROCEDURE — 3074F SYST BP LT 130 MM HG: CPT | Performed by: PHYSICIAN ASSISTANT

## 2025-03-26 PROCEDURE — G2211 COMPLEX E/M VISIT ADD ON: HCPCS | Performed by: PHYSICIAN ASSISTANT

## 2025-03-26 PROCEDURE — 1123F ACP DISCUSS/DSCN MKR DOCD: CPT | Performed by: PHYSICIAN ASSISTANT

## 2025-03-26 PROCEDURE — G8417 CALC BMI ABV UP PARAM F/U: HCPCS | Performed by: PHYSICIAN ASSISTANT

## 2025-03-26 NOTE — PROGRESS NOTES
[facility] ENDOCRINOLOGY   AND   THYROID NODULE CLINIC    Shaila Taylor PA-C  [facility]  [address], [address]  [place] [address]  Phone [phone]  Facsimile [phone]          Radha Wylie is a 74 y.o. female seen 3/26/2025 for follow up evaluation of type 2 diabetes        Assessment and Plan:  Assessment & Plan      1. Type 2 diabetes mellitus with diabetic polyneuropathy, without long-term current use of insulin (HCC)  Stable. A1c improved from 7.7 to 7.1, positive response to Jardiance.  - Current regimen: low-dose Jardiance and Trulicity, no injection site issues.  - Encourage hydration, regular physical activity, high-protein, high-fiber diet.  - Blood work today for kidney function, urine sample for microalbumin.    Constipation: Reports constipation and gas, possibly related to Jardiance.  - Advise increased water intake, more dietary fiber, regular physical activity.  - Provide constipation management information, continue stool softeners and Dulcolax as needed.  - Check thyroid levels.    Corns on left foot: Approximately 3 corns on left foot pad, causing soreness.  - Schedule podiatrist appointment for evaluation and management.  - Recommend corn pads to alleviate pressure.      - AMB POC HEMOGLOBIN A1C  - Comprehensive Metabolic Panel; Future  - Albumin/Creatinine Ratio, Urine; Future  - TSH reflex to FT4; Future  - HM DIABETES FOOT EXAM        2. Essential hypertension, benign  BP Readings from Last 3 Encounters:   03/26/25 120/68   02/03/25 (!) 140/65   11/13/24 124/82       3. Mixed diabetic hyperlipidemia associated with type 2 diabetes mellitus (HCC)  Patient reportedly statin intolerant.  Encouraged high-fiber diet    4. Myalgia due to statin  Patient reportedly statin intolerant.  Encouraged high-fiber diet        5. Exudative age-related macular degeneration of right eye with active choroidal neovascularization

## 2025-04-02 DIAGNOSIS — I10 PRIMARY HYPERTENSION: Primary | ICD-10-CM

## 2025-04-02 DIAGNOSIS — E11.9 TYPE 2 DIABETES MELLITUS WITHOUT COMPLICATION, WITHOUT LONG-TERM CURRENT USE OF INSULIN: ICD-10-CM

## 2025-04-02 DIAGNOSIS — E11.69 MIXED DIABETIC HYPERLIPIDEMIA ASSOCIATED WITH TYPE 2 DIABETES MELLITUS: ICD-10-CM

## 2025-04-02 DIAGNOSIS — E78.2 MIXED DIABETIC HYPERLIPIDEMIA ASSOCIATED WITH TYPE 2 DIABETES MELLITUS: ICD-10-CM

## 2025-04-16 ENCOUNTER — LAB (OUTPATIENT)
Dept: FAMILY MEDICINE CLINIC | Facility: CLINIC | Age: 75
End: 2025-04-16

## 2025-04-16 DIAGNOSIS — I10 PRIMARY HYPERTENSION: ICD-10-CM

## 2025-04-16 DIAGNOSIS — E78.2 MIXED DIABETIC HYPERLIPIDEMIA ASSOCIATED WITH TYPE 2 DIABETES MELLITUS: ICD-10-CM

## 2025-04-16 DIAGNOSIS — E11.69 MIXED DIABETIC HYPERLIPIDEMIA ASSOCIATED WITH TYPE 2 DIABETES MELLITUS: ICD-10-CM

## 2025-04-16 LAB
BASOPHILS # BLD: 0.03 K/UL (ref 0–0.2)
BASOPHILS NFR BLD: 0.3 % (ref 0–2)
CHOLEST SERPL-MCNC: 196 MG/DL (ref 0–200)
DIFFERENTIAL METHOD BLD: NORMAL
EOSINOPHIL # BLD: 0.11 K/UL (ref 0–0.8)
EOSINOPHIL NFR BLD: 1.2 % (ref 0.5–7.8)
ERYTHROCYTE [DISTWIDTH] IN BLOOD BY AUTOMATED COUNT: 12.5 % (ref 11.9–14.6)
HCT VFR BLD AUTO: 40.9 % (ref 35.8–46.3)
HDLC SERPL-MCNC: 47 MG/DL (ref 40–60)
HDLC SERPL: 4.1 (ref 0–5)
HGB BLD-MCNC: 13.1 G/DL (ref 11.7–15.4)
IMM GRANULOCYTES # BLD AUTO: 0.02 K/UL (ref 0–0.5)
IMM GRANULOCYTES NFR BLD AUTO: 0.2 % (ref 0–5)
LDLC SERPL CALC-MCNC: 109 MG/DL (ref 0–100)
LYMPHOCYTES # BLD: 2.53 K/UL (ref 0.5–4.6)
LYMPHOCYTES NFR BLD: 26.7 % (ref 13–44)
MCH RBC QN AUTO: 31.1 PG (ref 26.1–32.9)
MCHC RBC AUTO-ENTMCNC: 32 G/DL (ref 31.4–35)
MCV RBC AUTO: 97.1 FL (ref 82–102)
MONOCYTES # BLD: 0.52 K/UL (ref 0.1–1.3)
MONOCYTES NFR BLD: 5.5 % (ref 4–12)
NEUTS SEG # BLD: 6.28 K/UL (ref 1.7–8.2)
NEUTS SEG NFR BLD: 66.1 % (ref 43–78)
NRBC # BLD: 0 K/UL (ref 0–0.2)
PLATELET # BLD AUTO: 246 K/UL (ref 150–450)
PMV BLD AUTO: 11.4 FL (ref 9.4–12.3)
RBC # BLD AUTO: 4.21 M/UL (ref 4.05–5.2)
TRIGL SERPL-MCNC: 199 MG/DL (ref 0–150)
VLDLC SERPL CALC-MCNC: 40 MG/DL (ref 6–23)
WBC # BLD AUTO: 9.5 K/UL (ref 4.3–11.1)

## 2025-04-23 ENCOUNTER — OFFICE VISIT (OUTPATIENT)
Dept: FAMILY MEDICINE CLINIC | Facility: CLINIC | Age: 75
End: 2025-04-23
Payer: MEDICARE

## 2025-04-23 VITALS
OXYGEN SATURATION: 99 % | WEIGHT: 163 LBS | HEIGHT: 64 IN | SYSTOLIC BLOOD PRESSURE: 117 MMHG | HEART RATE: 70 BPM | BODY MASS INDEX: 27.83 KG/M2 | RESPIRATION RATE: 18 BRPM | TEMPERATURE: 97.9 F | DIASTOLIC BLOOD PRESSURE: 54 MMHG

## 2025-04-23 DIAGNOSIS — I10 PRIMARY HYPERTENSION: ICD-10-CM

## 2025-04-23 DIAGNOSIS — H35.3211 EXUDATIVE AGE-RELATED MACULAR DEGENERATION OF RIGHT EYE WITH ACTIVE CHOROIDAL NEOVASCULARIZATION (HCC): ICD-10-CM

## 2025-04-23 DIAGNOSIS — F33.1 MODERATE EPISODE OF RECURRENT MAJOR DEPRESSIVE DISORDER (HCC): ICD-10-CM

## 2025-04-23 DIAGNOSIS — E11.42 TYPE 2 DIABETES MELLITUS WITH DIABETIC POLYNEUROPATHY, WITHOUT LONG-TERM CURRENT USE OF INSULIN (HCC): ICD-10-CM

## 2025-04-23 DIAGNOSIS — Z91.09 POLLEN ALLERGIES: ICD-10-CM

## 2025-04-23 DIAGNOSIS — E78.2 MIXED DIABETIC HYPERLIPIDEMIA ASSOCIATED WITH TYPE 2 DIABETES MELLITUS (HCC): ICD-10-CM

## 2025-04-23 DIAGNOSIS — Z00.00 MEDICARE ANNUAL WELLNESS VISIT, SUBSEQUENT: Primary | ICD-10-CM

## 2025-04-23 DIAGNOSIS — Z12.11 SCREENING FOR MALIGNANT NEOPLASM OF COLON: ICD-10-CM

## 2025-04-23 DIAGNOSIS — E11.69 MIXED DIABETIC HYPERLIPIDEMIA ASSOCIATED WITH TYPE 2 DIABETES MELLITUS (HCC): ICD-10-CM

## 2025-04-23 DIAGNOSIS — E11.9 TYPE 2 DIABETES MELLITUS WITHOUT COMPLICATION, WITHOUT LONG-TERM CURRENT USE OF INSULIN (HCC): ICD-10-CM

## 2025-04-23 PROCEDURE — 2022F DILAT RTA XM EVC RTNOPTHY: CPT | Performed by: FAMILY MEDICINE

## 2025-04-23 PROCEDURE — 99214 OFFICE O/P EST MOD 30 MIN: CPT | Performed by: FAMILY MEDICINE

## 2025-04-23 PROCEDURE — 3051F HG A1C>EQUAL 7.0%<8.0%: CPT | Performed by: FAMILY MEDICINE

## 2025-04-23 PROCEDURE — G2211 COMPLEX E/M VISIT ADD ON: HCPCS | Performed by: FAMILY MEDICINE

## 2025-04-23 PROCEDURE — G0439 PPPS, SUBSEQ VISIT: HCPCS | Performed by: FAMILY MEDICINE

## 2025-04-23 PROCEDURE — 1123F ACP DISCUSS/DSCN MKR DOCD: CPT | Performed by: FAMILY MEDICINE

## 2025-04-23 PROCEDURE — 3046F HEMOGLOBIN A1C LEVEL >9.0%: CPT | Performed by: FAMILY MEDICINE

## 2025-04-23 PROCEDURE — G8417 CALC BMI ABV UP PARAM F/U: HCPCS | Performed by: FAMILY MEDICINE

## 2025-04-23 PROCEDURE — 3078F DIAST BP <80 MM HG: CPT | Performed by: FAMILY MEDICINE

## 2025-04-23 PROCEDURE — 1036F TOBACCO NON-USER: CPT | Performed by: FAMILY MEDICINE

## 2025-04-23 PROCEDURE — 3074F SYST BP LT 130 MM HG: CPT | Performed by: FAMILY MEDICINE

## 2025-04-23 PROCEDURE — G8399 PT W/DXA RESULTS DOCUMENT: HCPCS | Performed by: FAMILY MEDICINE

## 2025-04-23 PROCEDURE — 3017F COLORECTAL CA SCREEN DOC REV: CPT | Performed by: FAMILY MEDICINE

## 2025-04-23 PROCEDURE — G8427 DOCREV CUR MEDS BY ELIG CLIN: HCPCS | Performed by: FAMILY MEDICINE

## 2025-04-23 PROCEDURE — 1090F PRES/ABSN URINE INCON ASSESS: CPT | Performed by: FAMILY MEDICINE

## 2025-04-23 RX ORDER — OLMESARTAN MEDOXOMIL 40 MG/1
40 TABLET ORAL DAILY
Qty: 90 TABLET | Refills: 3 | Status: SHIPPED | OUTPATIENT
Start: 2025-04-23

## 2025-04-23 RX ORDER — FLUTICASONE PROPIONATE 50 MCG
SPRAY, SUSPENSION (ML) NASAL
Qty: 3 EACH | Refills: 3 | Status: SHIPPED | OUTPATIENT
Start: 2025-04-23

## 2025-04-23 RX ORDER — VENLAFAXINE HYDROCHLORIDE 75 MG/1
75 CAPSULE, EXTENDED RELEASE ORAL DAILY
Qty: 90 CAPSULE | Refills: 3 | Status: SHIPPED | OUTPATIENT
Start: 2025-04-23

## 2025-04-23 ASSESSMENT — PATIENT HEALTH QUESTIONNAIRE - PHQ9
SUM OF ALL RESPONSES TO PHQ QUESTIONS 1-9: 0
10. IF YOU CHECKED OFF ANY PROBLEMS, HOW DIFFICULT HAVE THESE PROBLEMS MADE IT FOR YOU TO DO YOUR WORK, TAKE CARE OF THINGS AT HOME, OR GET ALONG WITH OTHER PEOPLE: NOT DIFFICULT AT ALL
7. TROUBLE CONCENTRATING ON THINGS, SUCH AS READING THE NEWSPAPER OR WATCHING TELEVISION: NOT AT ALL
4. FEELING TIRED OR HAVING LITTLE ENERGY: NOT AT ALL
3. TROUBLE FALLING OR STAYING ASLEEP: NOT AT ALL
SUM OF ALL RESPONSES TO PHQ QUESTIONS 1-9: 0
6. FEELING BAD ABOUT YOURSELF - OR THAT YOU ARE A FAILURE OR HAVE LET YOURSELF OR YOUR FAMILY DOWN: NOT AT ALL
SUM OF ALL RESPONSES TO PHQ QUESTIONS 1-9: 0
8. MOVING OR SPEAKING SO SLOWLY THAT OTHER PEOPLE COULD HAVE NOTICED. OR THE OPPOSITE, BEING SO FIGETY OR RESTLESS THAT YOU HAVE BEEN MOVING AROUND A LOT MORE THAN USUAL: NOT AT ALL
2. FEELING DOWN, DEPRESSED OR HOPELESS: NOT AT ALL
5. POOR APPETITE OR OVEREATING: NOT AT ALL
9. THOUGHTS THAT YOU WOULD BE BETTER OFF DEAD, OR OF HURTING YOURSELF: NOT AT ALL
1. LITTLE INTEREST OR PLEASURE IN DOING THINGS: NOT AT ALL
SUM OF ALL RESPONSES TO PHQ QUESTIONS 1-9: 0

## 2025-04-23 ASSESSMENT — LIFESTYLE VARIABLES
HOW OFTEN DO YOU HAVE A DRINK CONTAINING ALCOHOL: NEVER
HOW MANY STANDARD DRINKS CONTAINING ALCOHOL DO YOU HAVE ON A TYPICAL DAY: PATIENT DOES NOT DRINK

## 2025-04-23 NOTE — PROGRESS NOTES
Medicare Annual Wellness Visit    Radha Wylie is here for Medicare AWV (sub)    Assessment & Plan  1. Wellness visit.  - Blood pressure readings are within the normal range today at 117/54.  - The most recent colonoscopy was conducted in 2017, indicating that she is currently overdue for this procedure.  - Advised to consider receiving the RSV and Shingrix vaccines, provided her insurance coverage permits.  - A prescription for Flonase nasal spray will be issued to manage allergy symptoms. Recommended to take Claritin in the morning and Allegra at night. An order for a colonoscopy will be placed, which she can schedule later in the year.    2. Type 2 Diabetes Mellitus.  - Currently on Trulicity injections and Jardiance 10 mg.  - A1c reported to be around 7.2.  - Advised to continue current medication regimen and work on lowering A1c by monitoring diet, particularly reducing sweets intake.  She will follow-up with endocrinology.    3. Hypertension.  Stable.  - On olmesartan 40 mg, which has been effective in maintaining blood pressure within the normal range.  - Jardiance also helps in lowering blood pressure.  - A refill for olmesartan will be provided.    4. Depression.  Stable.  - On Effexor (venlafaxine) and reports significant improvement in symptoms with no thoughts of suicide.  - A refill for venlafaxine will be provided.    5. Allergic Rhinitis.  - Reports sinus drainage and ear fullness, which have improved with Claritin.  - Advised to continue using Claritin in the morning and consider adding Allegra at night.  - A prescription for Flonase nasal spray will be issued to help with symptoms.    6. Macular Degeneration.  - Reports condition is stable and not requiring any injections.  - Advised to continue regular follow-ups with eye doctor.    Follow-up  - Follow up in 1 year or sooner if needed.    Medicare annual wellness visit, subsequent  Pollen allergies  The following orders have not been

## 2025-05-20 ENCOUNTER — OFFICE VISIT (OUTPATIENT)
Dept: FAMILY MEDICINE CLINIC | Facility: CLINIC | Age: 75
End: 2025-05-20
Payer: MEDICARE

## 2025-05-20 VITALS
DIASTOLIC BLOOD PRESSURE: 58 MMHG | SYSTOLIC BLOOD PRESSURE: 132 MMHG | OXYGEN SATURATION: 96 % | WEIGHT: 168 LBS | TEMPERATURE: 98.3 F | BODY MASS INDEX: 28.68 KG/M2 | HEART RATE: 70 BPM | RESPIRATION RATE: 16 BRPM | HEIGHT: 64 IN

## 2025-05-20 DIAGNOSIS — M54.42 ACUTE LEFT-SIDED LOW BACK PAIN WITH LEFT-SIDED SCIATICA: Primary | ICD-10-CM

## 2025-05-20 DIAGNOSIS — E11.9 TYPE 2 DIABETES MELLITUS WITHOUT COMPLICATION, WITHOUT LONG-TERM CURRENT USE OF INSULIN (HCC): ICD-10-CM

## 2025-05-20 PROCEDURE — 3017F COLORECTAL CA SCREEN DOC REV: CPT | Performed by: PHYSICIAN ASSISTANT

## 2025-05-20 PROCEDURE — 3078F DIAST BP <80 MM HG: CPT | Performed by: PHYSICIAN ASSISTANT

## 2025-05-20 PROCEDURE — 99213 OFFICE O/P EST LOW 20 MIN: CPT | Performed by: PHYSICIAN ASSISTANT

## 2025-05-20 PROCEDURE — 1036F TOBACCO NON-USER: CPT | Performed by: PHYSICIAN ASSISTANT

## 2025-05-20 PROCEDURE — 3046F HEMOGLOBIN A1C LEVEL >9.0%: CPT | Performed by: PHYSICIAN ASSISTANT

## 2025-05-20 PROCEDURE — 2022F DILAT RTA XM EVC RTNOPTHY: CPT | Performed by: PHYSICIAN ASSISTANT

## 2025-05-20 PROCEDURE — G8417 CALC BMI ABV UP PARAM F/U: HCPCS | Performed by: PHYSICIAN ASSISTANT

## 2025-05-20 PROCEDURE — 3051F HG A1C>EQUAL 7.0%<8.0%: CPT | Performed by: PHYSICIAN ASSISTANT

## 2025-05-20 PROCEDURE — 1123F ACP DISCUSS/DSCN MKR DOCD: CPT | Performed by: PHYSICIAN ASSISTANT

## 2025-05-20 PROCEDURE — 1090F PRES/ABSN URINE INCON ASSESS: CPT | Performed by: PHYSICIAN ASSISTANT

## 2025-05-20 PROCEDURE — 3075F SYST BP GE 130 - 139MM HG: CPT | Performed by: PHYSICIAN ASSISTANT

## 2025-05-20 PROCEDURE — G8427 DOCREV CUR MEDS BY ELIG CLIN: HCPCS | Performed by: PHYSICIAN ASSISTANT

## 2025-05-20 PROCEDURE — G8399 PT W/DXA RESULTS DOCUMENT: HCPCS | Performed by: PHYSICIAN ASSISTANT

## 2025-05-20 RX ORDER — METHYLPREDNISOLONE 4 MG/1
TABLET ORAL
Qty: 1 KIT | Refills: 0 | Status: SHIPPED | OUTPATIENT
Start: 2025-05-20

## 2025-05-20 ASSESSMENT — ENCOUNTER SYMPTOMS
SHORTNESS OF BREATH: 0
BACK PAIN: 1

## 2025-05-20 NOTE — PROGRESS NOTES
[facility] [address] [address]  Phone [phone]      Patient: Radha Wylie  YOB: 1950  Age 74 y.o.  Sex female  Medical Record:  [id number]  Visit Date: 05/20/25  Author:  Brown Duong PA-C    Family Practice Clinic Note    Chief Complaint   Patient presents with    Back Pain       History of Present Illness  History of Present Illness  The patient is a 74-year-old female who presents today with complaints of low back pain.    She has been experiencing low back pain for approximately 3 weeks, which she attributes to recent strenuous activities involving lifting and packing heavy objects during a home renovation project. The pain, initially localized to her left side, has since radiated down her hip/back and into the posterior aspect of her leg. She reports no associated numbness or tingling in the leg, nor any weakness. There is no difficulty controlling her bowel or bladder function.  Despite attempts at self-management with ibuprofen 800 mg, Tylenol, and Biofreeze, the pain persists.  Denies urinary symptoms.    She is a diabetic.  She reports that her home blood sugars have been doing well.  She recently followed up with endocrinology and states that her most recent A1c was around 7%.  Currently taking Jardiance 10 mg daily and Trulicity 4.5 mg weekly.      Past History:    Past Medical history   Past Medical History:   Diagnosis Date    Diabetes ([facility])     Hypertension     controlled with med-denies SOB with 1 flight step    Menopause     Mixed diabetic hyperlipidemia associated with type 2 diabetes mellitus ([facility]) 8/24/2020    Tubulovillous adenoma of colon 4/28/2017    Type 2 diabetes mellitus with diabetic polyneuropathy, without long-term current use of insulin ([facility]) 06/22/2017    type 2; does not check glucose- last A1C= 8.9 on 2/15/17    Type II or unspecified type diabetes mellitus without mention of complication, not stated as uncontrolled

## 2025-05-20 NOTE — PATIENT INSTRUCTIONS
*Take the steroid taper pack as prescribed. Keep in mind that you will see a temporary increase in your blood sugars.  *Recommend alternating ice (for 10 min) and heat (for 30 min), 3-4 times a day as able/needed.  *A referral has been placed to physical therapy. They should contact you in the next week to schedule. Please let us know if you do not hear from them.

## 2025-06-11 ENCOUNTER — TELEPHONE (OUTPATIENT)
Dept: FAMILY MEDICINE CLINIC | Facility: CLINIC | Age: 75
End: 2025-06-11

## 2025-06-11 ENCOUNTER — OFFICE VISIT (OUTPATIENT)
Dept: FAMILY MEDICINE CLINIC | Facility: CLINIC | Age: 75
End: 2025-06-11
Payer: MEDICARE

## 2025-06-11 VITALS
HEART RATE: 74 BPM | BODY MASS INDEX: 28.32 KG/M2 | HEIGHT: 64 IN | WEIGHT: 165.9 LBS | TEMPERATURE: 97.9 F | SYSTOLIC BLOOD PRESSURE: 126 MMHG | OXYGEN SATURATION: 98 % | DIASTOLIC BLOOD PRESSURE: 56 MMHG | RESPIRATION RATE: 16 BRPM

## 2025-06-11 DIAGNOSIS — M54.41 ACUTE RIGHT-SIDED LOW BACK PAIN WITH RIGHT-SIDED SCIATICA: Primary | ICD-10-CM

## 2025-06-11 DIAGNOSIS — M54.42 ACUTE LEFT-SIDED LOW BACK PAIN WITH LEFT-SIDED SCIATICA: ICD-10-CM

## 2025-06-11 PROCEDURE — G8417 CALC BMI ABV UP PARAM F/U: HCPCS | Performed by: PHYSICIAN ASSISTANT

## 2025-06-11 PROCEDURE — 99213 OFFICE O/P EST LOW 20 MIN: CPT | Performed by: PHYSICIAN ASSISTANT

## 2025-06-11 PROCEDURE — 3078F DIAST BP <80 MM HG: CPT | Performed by: PHYSICIAN ASSISTANT

## 2025-06-11 PROCEDURE — G8399 PT W/DXA RESULTS DOCUMENT: HCPCS | Performed by: PHYSICIAN ASSISTANT

## 2025-06-11 PROCEDURE — 3017F COLORECTAL CA SCREEN DOC REV: CPT | Performed by: PHYSICIAN ASSISTANT

## 2025-06-11 PROCEDURE — 3074F SYST BP LT 130 MM HG: CPT | Performed by: PHYSICIAN ASSISTANT

## 2025-06-11 PROCEDURE — 1090F PRES/ABSN URINE INCON ASSESS: CPT | Performed by: PHYSICIAN ASSISTANT

## 2025-06-11 PROCEDURE — 1036F TOBACCO NON-USER: CPT | Performed by: PHYSICIAN ASSISTANT

## 2025-06-11 PROCEDURE — 1123F ACP DISCUSS/DSCN MKR DOCD: CPT | Performed by: PHYSICIAN ASSISTANT

## 2025-06-11 PROCEDURE — G8427 DOCREV CUR MEDS BY ELIG CLIN: HCPCS | Performed by: PHYSICIAN ASSISTANT

## 2025-06-11 RX ORDER — NABUMETONE 500 MG/1
500 TABLET, FILM COATED ORAL 2 TIMES DAILY
Qty: 60 TABLET | Refills: 0 | Status: SHIPPED | OUTPATIENT
Start: 2025-06-11

## 2025-06-11 ASSESSMENT — PATIENT HEALTH QUESTIONNAIRE - PHQ9
7. TROUBLE CONCENTRATING ON THINGS, SUCH AS READING THE NEWSPAPER OR WATCHING TELEVISION: NOT AT ALL
9. THOUGHTS THAT YOU WOULD BE BETTER OFF DEAD, OR OF HURTING YOURSELF: NOT AT ALL
SUM OF ALL RESPONSES TO PHQ QUESTIONS 1-9: 0
6. FEELING BAD ABOUT YOURSELF - OR THAT YOU ARE A FAILURE OR HAVE LET YOURSELF OR YOUR FAMILY DOWN: NOT AT ALL
SUM OF ALL RESPONSES TO PHQ QUESTIONS 1-9: 0
3. TROUBLE FALLING OR STAYING ASLEEP: NOT AT ALL
4. FEELING TIRED OR HAVING LITTLE ENERGY: NOT AT ALL
2. FEELING DOWN, DEPRESSED OR HOPELESS: NOT AT ALL
10. IF YOU CHECKED OFF ANY PROBLEMS, HOW DIFFICULT HAVE THESE PROBLEMS MADE IT FOR YOU TO DO YOUR WORK, TAKE CARE OF THINGS AT HOME, OR GET ALONG WITH OTHER PEOPLE: NOT DIFFICULT AT ALL
8. MOVING OR SPEAKING SO SLOWLY THAT OTHER PEOPLE COULD HAVE NOTICED. OR THE OPPOSITE, BEING SO FIGETY OR RESTLESS THAT YOU HAVE BEEN MOVING AROUND A LOT MORE THAN USUAL: NOT AT ALL
5. POOR APPETITE OR OVEREATING: NOT AT ALL
1. LITTLE INTEREST OR PLEASURE IN DOING THINGS: NOT AT ALL

## 2025-06-11 ASSESSMENT — ENCOUNTER SYMPTOMS
SHORTNESS OF BREATH: 0
BACK PAIN: 1

## 2025-06-11 NOTE — PROGRESS NOTES
[facility] [address] [place] [address]  Phone [phone]      Patient: Radha Wylie  YOB: 1950  Age 74 y.o.  Sex female  Medical Record:  [id number]  Visit Date: 06/11/25  Author:  Brown Duong PA-C    Family Practice Clinic Note    Chief Complaint   Patient presents with    Lower Back Pain     Right side       History of Present Illness  History of Present Illness  The patient is a 74-year-old female who presents for evaluation of right-sided low back pain.    She reports that her previously treated left-sided low back pain has resolved following a course of steroid treatment. However, she has now developed more severe pain on the right side, which extends down her leg. The onset of this new pain was noted on 06/07/2025, coinciding with physical activities such as taking out the garbage and lifting her grandchildren.  The pain is worsened with activity and walking.  After evaluation of her previous left-sided low back pain she was referred for physical therapy but she states that she did not schedule an appointment with them.  She has been managing her current discomfort with Biofreeze and Voltaren gel along with Tylenol intermittently.  She denies any numbness, tingling or weakness of the lower extremities.  Denies any loss of bowel or bladder control.      Past History:    Past Medical history   Past Medical History:   Diagnosis Date    Diabetes ([facility])     Hypertension     controlled with med-denies SOB with 1 flight step    Menopause     Mixed diabetic hyperlipidemia associated with type 2 diabetes mellitus ([facility]) 8/24/2020    Tubulovillous adenoma of colon 4/28/2017    Type 2 diabetes mellitus with diabetic polyneuropathy, without long-term current use of insulin ([facility]) 06/22/2017    type 2; does not check glucose- last A1C= 8.9 on 2/15/17    Type II or unspecified type diabetes mellitus without mention of complication, not stated as uncontrolled 08/05/2015

## 2025-06-11 NOTE — PATIENT INSTRUCTIONS
*Take the Relafen twice a day for the pain. Take with food. Do not take ibuprofen with this but you can use Tylenol as needed for breakthrough pain.  *Recommend alternating ice (for 10 min) and heat (for 30 min), 3-4 times a day as able/needed.  *Call the physical therapy office to schedule an appointment.

## 2025-06-11 NOTE — TELEPHONE ENCOUNTER
I called pt to clarify about her appointment today.  She states its her right side back pain and around to groin and goes down her hip and leg. Pt states her toes are numb and her right hand was numb but she can move it some now.   Pt states it hurts so bad to walk.   Pt states she thinks she will be okay to drive but not for sure.  Started hurting Saturday and has gotten worse.   Pain is a 8-10 when she's walking but resting it is a 5-6.   Please advise.   Pt states she recently started jardiance and wondering if that could be her problem...

## (undated) DEVICE — Device: Brand: DISPOSABLE ELECTROSURGICAL SNARE

## (undated) DEVICE — SYR 3ML LL TIP 1/10ML GRAD --

## (undated) DEVICE — KENDALL SCD EXPRESS SLEEVES, KNEE LENGTH, MEDIUM: Brand: KENDALL SCD

## (undated) DEVICE — NDL PRT INJ NSAF BLNT 18GX1.5 --

## (undated) DEVICE — SUTURE PDS II SZ 0 L27IN ABSRB VLT L26MM CT-2 1/2 CIR Z334H

## (undated) DEVICE — KENDALL RADIOLUCENT FOAM MONITORING ELECTRODE RECTANGULAR SHAPE: Brand: KENDALL

## (undated) DEVICE — SNARE POLYP SM W13MMXL240CM SHTH DIA2.4MM OVL FLX DISP

## (undated) DEVICE — DERMABOND SKIN ADH 0.7ML -- DERMABOND ADVANCED 12/BX

## (undated) DEVICE — DRSG AQUACEL SURG 3.5 X 10IN -- CONVERT TO ITEM 370183

## (undated) DEVICE — INTENDED FOR TISSUE SEPARATION, AND OTHER PROCEDURES THAT REQUIRE A SHARP SURGICAL BLADE TO PUNCTURE OR CUT.: Brand: BARD-PARKER SAFETY BLADES SIZE 10, STERILE

## (undated) DEVICE — CONNECTOR TBNG OD5-7MM O2 END DISP

## (undated) DEVICE — CONTAINER PREFIL FRMLN 40ML --

## (undated) DEVICE — 1200 GUARD II KIT W/5MM TUBE W/O VAC TUBE: Brand: GUARDIAN

## (undated) DEVICE — MEDI-VAC YANKAUER SUCTION HANDLE W/BULBOUS TIP: Brand: CARDINAL HEALTH

## (undated) DEVICE — AGENT HEMSTAT 5GM ARISTA AH

## (undated) DEVICE — REM POLYHESIVE ADULT PATIENT RETURN ELECTRODE: Brand: VALLEYLAB

## (undated) DEVICE — SUTURE STRATAFIX SPRL SZ 2-0 L9IN ABSRB VLT MH L36MM 1/2 SXPD2B408

## (undated) DEVICE — DRAIN SURG 15FR L3/16IN DIA4.7MM SIL CHN RND HUBLESS FULL

## (undated) DEVICE — SUTURE VCRL SZ 4-0 L27IN ABSRB UD L19MM PS-2 3/8 CIR PRIM J426H

## (undated) DEVICE — SOLUTION IV 1000ML 0.9% SOD CHL

## (undated) DEVICE — CLOTH PREP W7.5XL7.5IN 2% CHG 96 PER PK

## (undated) DEVICE — BINDER ABD M/L H12IN FOR 46-62IN WHT 4 SLD PNL DSGN HOOP

## (undated) DEVICE — INTENDED FOR TISSUE SEPARATION, AND OTHER PROCEDURES THAT REQUIRE A SHARP SURGICAL BLADE TO PUNCTURE OR CUT.: Brand: BARD-PARKER SAFETY BLADES SIZE 15, STERILE

## (undated) DEVICE — NDL FLTR TIP 5 MIC 18GX1.5IN --

## (undated) DEVICE — (D)PREP SKN CHLRAPRP APPL 26ML -- CONVERT TO ITEM 371833

## (undated) DEVICE — CANNULA NSL ORAL AD FOR CAPNOFLEX CO2 O2 AIRLFE

## (undated) DEVICE — (D)SYR 10ML 1/5ML GRAD NSAF -- PKGING CHANGE USE ITEM 338027

## (undated) DEVICE — NEEDLE HYPO 21GA L1.5IN INTRAMUSCULAR S STL LATCH BVL UP

## (undated) DEVICE — NEEDLE INJ 25GA P5MM SHFT L230CM SHTH DIA2.5MM S STL TEF

## (undated) DEVICE — Device

## (undated) DEVICE — SYR LR LCK 1ML GRAD NSAF 30ML --

## (undated) DEVICE — SHEET, T, LAPAROTOMY, STERILE: Brand: MEDLINE

## (undated) DEVICE — SUTURE PDS II SZ 0 L27IN ABSRB VLT L36MM CT-1 1/2 CIR Z340H

## (undated) DEVICE — SURGICAL PROCEDURE PACK BASIC ST FRANCIS

## (undated) DEVICE — SYR 5ML 1/5 GRAD LL NSAF LF --

## (undated) DEVICE — TRAY CATH 16F DRN BG LTX -- CONVERT TO ITEM 363158

## (undated) DEVICE — 3M™ IOBAN™ 2 ANTIMICROBIAL INCISE DRAPE 6650EZ: Brand: IOBAN™ 2

## (undated) DEVICE — SUTURE VCRL SZ 3-0 L27IN ABSRB UD L26MM SH 1/2 CIR J416H